# Patient Record
Sex: MALE | Race: WHITE | Employment: UNEMPLOYED | URBAN - METROPOLITAN AREA
[De-identification: names, ages, dates, MRNs, and addresses within clinical notes are randomized per-mention and may not be internally consistent; named-entity substitution may affect disease eponyms.]

---

## 2019-07-15 ENCOUNTER — IP HISTORICAL/CONVERTED ENCOUNTER (OUTPATIENT)
Dept: OTHER | Age: 39
End: 2019-07-15

## 2019-12-13 ENCOUNTER — HOSPITAL ENCOUNTER (INPATIENT)
Age: 39
LOS: 2 days | Discharge: HOME | DRG: 861 | End: 2019-12-16
Attending: EMERGENCY MEDICINE | Admitting: PSYCHIATRY & NEUROLOGY
Payer: MEDICAID

## 2019-12-13 DIAGNOSIS — F33.9 RECURRENT MAJOR DEPRESSIVE DISORDER, REMISSION STATUS UNSPECIFIED (HCC): Primary | ICD-10-CM

## 2019-12-13 LAB
ALBUMIN SERPL-MCNC: 3.9 G/DL (ref 3.5–5)
ALBUMIN/GLOB SERPL: 1.1 {RATIO} (ref 1.1–2.2)
ALP SERPL-CCNC: 86 U/L (ref 45–117)
ALT SERPL-CCNC: 21 U/L (ref 12–78)
AMPHET UR QL SCN: POSITIVE
ANION GAP SERPL CALC-SCNC: 9 MMOL/L (ref 5–15)
AST SERPL-CCNC: 14 U/L (ref 15–37)
BARBITURATES UR QL SCN: NEGATIVE
BASOPHILS # BLD: 0 K/UL (ref 0–0.1)
BASOPHILS NFR BLD: 1 % (ref 0–1)
BENZODIAZ UR QL: NEGATIVE
BILIRUB SERPL-MCNC: 0.3 MG/DL (ref 0.2–1)
BUN SERPL-MCNC: 11 MG/DL (ref 6–20)
BUN/CREAT SERPL: 12 (ref 12–20)
CALCIUM SERPL-MCNC: 9.4 MG/DL (ref 8.5–10.1)
CANNABINOIDS UR QL SCN: POSITIVE
CHLORIDE SERPL-SCNC: 102 MMOL/L (ref 97–108)
CO2 SERPL-SCNC: 28 MMOL/L (ref 21–32)
COCAINE UR QL SCN: NEGATIVE
CREAT SERPL-MCNC: 0.89 MG/DL (ref 0.7–1.3)
DIFFERENTIAL METHOD BLD: NORMAL
DRUG SCRN COMMENT,DRGCM: ABNORMAL
EOSINOPHIL # BLD: 0.1 K/UL (ref 0–0.4)
EOSINOPHIL NFR BLD: 2 % (ref 0–7)
ERYTHROCYTE [DISTWIDTH] IN BLOOD BY AUTOMATED COUNT: 13.4 % (ref 11.5–14.5)
ETHANOL SERPL-MCNC: <10 MG/DL
GLOBULIN SER CALC-MCNC: 3.4 G/DL (ref 2–4)
GLUCOSE SERPL-MCNC: 127 MG/DL (ref 65–100)
HCT VFR BLD AUTO: 44.5 % (ref 36.6–50.3)
HGB BLD-MCNC: 15.7 G/DL (ref 12.1–17)
IMM GRANULOCYTES # BLD AUTO: 0 K/UL (ref 0–0.04)
IMM GRANULOCYTES NFR BLD AUTO: 0 % (ref 0–0.5)
LYMPHOCYTES # BLD: 2.2 K/UL (ref 0.8–3.5)
LYMPHOCYTES NFR BLD: 28 % (ref 12–49)
MCH RBC QN AUTO: 32.7 PG (ref 26–34)
MCHC RBC AUTO-ENTMCNC: 35.3 G/DL (ref 30–36.5)
MCV RBC AUTO: 92.7 FL (ref 80–99)
METHADONE UR QL: NEGATIVE
MONOCYTES # BLD: 0.8 K/UL (ref 0–1)
MONOCYTES NFR BLD: 10 % (ref 5–13)
NEUTS SEG # BLD: 4.7 K/UL (ref 1.8–8)
NEUTS SEG NFR BLD: 60 % (ref 32–75)
NRBC # BLD: 0 K/UL (ref 0–0.01)
NRBC BLD-RTO: 0 PER 100 WBC
OPIATES UR QL: NEGATIVE
PCP UR QL: NEGATIVE
PLATELET # BLD AUTO: 181 K/UL (ref 150–400)
PMV BLD AUTO: 11.5 FL (ref 8.9–12.9)
POTASSIUM SERPL-SCNC: 3.9 MMOL/L (ref 3.5–5.1)
PROT SERPL-MCNC: 7.3 G/DL (ref 6.4–8.2)
RBC # BLD AUTO: 4.8 M/UL (ref 4.1–5.7)
SODIUM SERPL-SCNC: 139 MMOL/L (ref 136–145)
WBC # BLD AUTO: 7.8 K/UL (ref 4.1–11.1)

## 2019-12-13 PROCEDURE — 85025 COMPLETE CBC W/AUTO DIFF WBC: CPT

## 2019-12-13 PROCEDURE — 80307 DRUG TEST PRSMV CHEM ANLYZR: CPT

## 2019-12-13 PROCEDURE — 36415 COLL VENOUS BLD VENIPUNCTURE: CPT

## 2019-12-13 PROCEDURE — 99285 EMERGENCY DEPT VISIT HI MDM: CPT

## 2019-12-13 PROCEDURE — 80053 COMPREHEN METABOLIC PANEL: CPT

## 2019-12-14 PROBLEM — F32.9 MAJOR DEPRESSIVE DISORDER: Status: ACTIVE | Noted: 2019-12-14

## 2019-12-14 PROCEDURE — 74011250637 HC RX REV CODE- 250/637: Performed by: PSYCHIATRY & NEUROLOGY

## 2019-12-14 PROCEDURE — 65220000003 HC RM SEMIPRIVATE PSYCH

## 2019-12-14 PROCEDURE — 74011250637 HC RX REV CODE- 250/637: Performed by: FAMILY MEDICINE

## 2019-12-14 PROCEDURE — 74011250637 HC RX REV CODE- 250/637: Performed by: NURSE PRACTITIONER

## 2019-12-14 RX ORDER — IBUPROFEN 200 MG
1 TABLET ORAL DAILY
Status: DISCONTINUED | OUTPATIENT
Start: 2019-12-14 | End: 2019-12-16 | Stop reason: HOSPADM

## 2019-12-14 RX ORDER — TRAZODONE HYDROCHLORIDE 50 MG/1
50 TABLET ORAL
Status: DISCONTINUED | OUTPATIENT
Start: 2019-12-14 | End: 2019-12-16 | Stop reason: HOSPADM

## 2019-12-14 RX ORDER — BENZTROPINE MESYLATE 1 MG/1
1 TABLET ORAL
Status: DISCONTINUED | OUTPATIENT
Start: 2019-12-14 | End: 2019-12-16 | Stop reason: HOSPADM

## 2019-12-14 RX ORDER — ADHESIVE BANDAGE
30 BANDAGE TOPICAL DAILY PRN
Status: DISCONTINUED | OUTPATIENT
Start: 2019-12-14 | End: 2019-12-16 | Stop reason: HOSPADM

## 2019-12-14 RX ORDER — DOXYCYCLINE HYCLATE 100 MG
100 TABLET ORAL EVERY 12 HOURS
Status: DISCONTINUED | OUTPATIENT
Start: 2019-12-14 | End: 2019-12-16 | Stop reason: HOSPADM

## 2019-12-14 RX ORDER — HALOPERIDOL 5 MG/ML
5 INJECTION INTRAMUSCULAR
Status: DISCONTINUED | OUTPATIENT
Start: 2019-12-14 | End: 2019-12-16 | Stop reason: HOSPADM

## 2019-12-14 RX ORDER — ACETAMINOPHEN 325 MG/1
650 TABLET ORAL
Status: DISCONTINUED | OUTPATIENT
Start: 2019-12-14 | End: 2019-12-15

## 2019-12-14 RX ORDER — NAPROXEN 250 MG/1
500 TABLET ORAL
Status: DISCONTINUED | OUTPATIENT
Start: 2019-12-14 | End: 2019-12-16 | Stop reason: HOSPADM

## 2019-12-14 RX ORDER — HYDROXYZINE 25 MG/1
50 TABLET, FILM COATED ORAL
Status: DISCONTINUED | OUTPATIENT
Start: 2019-12-14 | End: 2019-12-16 | Stop reason: HOSPADM

## 2019-12-14 RX ORDER — LORAZEPAM 2 MG/ML
1 INJECTION INTRAMUSCULAR
Status: DISCONTINUED | OUTPATIENT
Start: 2019-12-14 | End: 2019-12-16 | Stop reason: HOSPADM

## 2019-12-14 RX ORDER — OLANZAPINE 5 MG/1
5 TABLET ORAL
Status: DISCONTINUED | OUTPATIENT
Start: 2019-12-14 | End: 2019-12-16 | Stop reason: HOSPADM

## 2019-12-14 RX ORDER — DIPHENHYDRAMINE HYDROCHLORIDE 50 MG/ML
50 INJECTION, SOLUTION INTRAMUSCULAR; INTRAVENOUS
Status: DISCONTINUED | OUTPATIENT
Start: 2019-12-14 | End: 2019-12-16 | Stop reason: HOSPADM

## 2019-12-14 RX ADMIN — Medication: at 12:29

## 2019-12-14 RX ADMIN — DOXYCYCLINE HYCLATE 100 MG: 100 TABLET, COATED ORAL at 12:29

## 2019-12-14 RX ADMIN — ACETAMINOPHEN 650 MG: 325 TABLET ORAL at 09:58

## 2019-12-14 RX ADMIN — DOXYCYCLINE HYCLATE 100 MG: 100 TABLET, COATED ORAL at 21:17

## 2019-12-14 RX ADMIN — HYDROXYZINE HYDROCHLORIDE 50 MG: 25 TABLET, FILM COATED ORAL at 10:10

## 2019-12-14 NOTE — ED NOTES
Patient presents to ED under an ECO with [place] police. Police were called out to patient home for a domestic. Patient states that he has been  for 15 years and caught wife cheating. Patient also states that younger daughter committed suicide around this time of year and this can be a trigger. States that he currently takes Wellbutrin daily. Denies alcohol and drug use. Denies SI/HI. Patient is alert and oriented x 4 and in no acute distress at this time. Respirations are at a regular rate, depth, and pattern. Patient updated on plan of care and has no questions or concerns at this time. Call bell within reach. Will continue to monitor. Please reference nursing assessment. Emergency Department Nursing Plan of Care       The Nursing Plan of Care is developed from the Nursing assessment and Emergency Department Attending provider initial evaluation. The plan of care may be reviewed in the ED Provider note.     The Plan of Care was developed with the following considerations:   Patient / Family readiness to learn indicated by:verbalized understanding and successful return demonstration  Persons(s) to be included in education: patient  Barriers to Learning/Limitations:No    Signed     Flo Nino RN    12/13/2019   9:34 PM

## 2019-12-14 NOTE — ED PROVIDER NOTES
EMERGENCY DEPARTMENT HISTORY AND PHYSICAL EXAM      Date: 12/13/2019  Patient Name: Stanislaw Lang    History of Presenting Illness     Chief Complaint   Patient presents with    Mental Health Problem       History Provided By: Patient, Crisis    HPI: Stanislaw Lang, 46 y.o. male with PMHx significant for depression who presents in police custody for mental health problem. To me, patient reports that this evening he found out his wife was cheating on him and became upset. He states that his wife then called and got a EC O filed on him to be brought to the emergency department. Patient denies any suicidal or homicidal thoughts at this time. Does admit that back in July he attempted to overdose. Reports that this was in the setting of his daughter committing suicide. He tells me \"I made a stupid mistake. \"  He tells me he is compliant with his home Wellbutrin. PCP: None    There are no other complaints, changes, or physical findings at this time. Current Outpatient Medications   Medication Sig Dispense Refill    bupropion HCl (WELLBUTRIN XL PO) Take 50 mg by mouth. Past History     Past Medical History:  History reviewed. No pertinent past medical history. Past Surgical History:  History reviewed. No pertinent surgical history. Family History:  History reviewed. No pertinent family history. Social History:  Social History     Tobacco Use    Smoking status: Not on file   Substance Use Topics    Alcohol use: Not on file    Drug use: Not on file     Allergies: Allergies   Allergen Reactions    Pcn [Penicillins] Anaphylaxis     Review of Systems   Review of Systems   Constitutional: Negative for chills and fever. HENT: Negative for congestion, rhinorrhea and sore throat. Respiratory: Negative for cough and shortness of breath. Cardiovascular: Negative for chest pain. Gastrointestinal: Negative for abdominal pain, nausea and vomiting.    Genitourinary: Negative for dysuria and urgency. Skin: Negative for rash. Neurological: Negative for dizziness, light-headedness and headaches. All other systems reviewed and are negative. Physical Exam   Physical Exam  Vitals signs and nursing note reviewed. Constitutional:       General: He is not in acute distress. Appearance: He is well-developed. HENT:      Head: Normocephalic and atraumatic. Eyes:      Conjunctiva/sclera: Conjunctivae normal.      Pupils: Pupils are equal, round, and reactive to light. Neck:      Musculoskeletal: Normal range of motion. Cardiovascular:      Rate and Rhythm: Normal rate and regular rhythm. Pulmonary:      Effort: Pulmonary effort is normal. No respiratory distress. Breath sounds: Normal breath sounds. No stridor. Abdominal:      General: There is no distension. Palpations: Abdomen is soft. Tenderness: There is no tenderness. Musculoskeletal: Normal range of motion. Skin:     General: Skin is warm and dry. Neurological:      Mental Status: He is alert and oriented to person, place, and time. Psychiatric:         Attention and Perception: Attention normal.         Mood and Affect: Mood normal.         Speech: Speech normal.         Behavior: Behavior is cooperative. Thought Content: Thought content does not include homicidal or suicidal ideation. Diagnostic Study Results   Labs -     Recent Results (from the past 12 hour(s))   CBC WITH AUTOMATED DIFF    Collection Time: 12/13/19 10:22 PM   Result Value Ref Range    WBC 7.8 4.1 - 11.1 K/uL    RBC 4.80 4. 10 - 5.70 M/uL    HGB 15.7 12.1 - 17.0 g/dL    HCT 44.5 36.6 - 50.3 %    MCV 92.7 80.0 - 99.0 FL    MCH 32.7 26.0 - 34.0 PG    MCHC 35.3 30.0 - 36.5 g/dL    RDW 13.4 11.5 - 14.5 %    PLATELET 676 476 - 288 K/uL    MPV 11.5 8.9 - 12.9 FL    NRBC 0.0 0  WBC    ABSOLUTE NRBC 0.00 0.00 - 0.01 K/uL    NEUTROPHILS 60 32 - 75 %    LYMPHOCYTES 28 12 - 49 %    MONOCYTES 10 5 - 13 %    EOSINOPHILS 2 0 - 7 % BASOPHILS 1 0 - 1 %    IMMATURE GRANULOCYTES 0 0.0 - 0.5 %    ABS. NEUTROPHILS 4.7 1.8 - 8.0 K/UL    ABS. LYMPHOCYTES 2.2 0.8 - 3.5 K/UL    ABS. MONOCYTES 0.8 0.0 - 1.0 K/UL    ABS. EOSINOPHILS 0.1 0.0 - 0.4 K/UL    ABS. BASOPHILS 0.0 0.0 - 0.1 K/UL    ABS. IMM. GRANS. 0.0 0.00 - 0.04 K/UL    DF AUTOMATED         Radiologic Studies -   No orders to display     No results found. Medical Decision Making   I am the first provider for this patient. I reviewed the vital signs, available nursing notes, past medical history, past surgical history, family history and social history. Vital Signs-Reviewed the patient's vital signs. Patient Vitals for the past 12 hrs:   Temp Pulse Resp BP SpO2   12/13/19 2125 98.1 °F (36.7 °C) 63 18 146/86 99 %       Pulse Oximetry Analysis - 99% on RA      Records Reviewed: Nursing Notes and Old Medical Records    Provider Notes (Medical Decision Making):   Patient presents in police custody for mental health problem. Patient currently denies any suicidal homicidal thoughts. I discussed with crisis would be gathering collateral information. Reportedly, patient had been acting more erratic for last several days. Wife told crisis that the patient was refusing to let her leave the home and had been verbally abusive. Wife also reported to crisis that the patient was not taking his medications as prescribed. Will proceed with basic lab work, alcohol level, urine drug screen. Crisis currently gathering additional collateral information to determine disposition    ED Course:   Initial assessment performed. The patients presenting problems have been discussed, and they are in agreement with the care plan formulated and outlined with them. I have encouraged them to ask questions as they arise throughout their visit.     ED Course as of Dec 14 0148   Fri Dec 13, 2019   2252 Patient is medically cleared    [ALEJANDRA]   Sat Dec 14, 2019   0031 Per crisis, plan for tdo     [ALEJANDRA]      ED Course User Index  Danniel Dancer, MD     Admitted by Dr. Suly Khan at [address]:  none    Disposition:  Admit to psych      Diagnosis     Clinical Impression:   1. Recurrent major depressive disorder, remission status unspecified ([facility]        This note will not be viewable in [employer]. Please note that this dictation was completed with Language Learning [employer], the computer voice recognition software. Quite often unanticipated grammatical, syntax, homophones, and other interpretive errors are inadvertently transcribed by the computer software. Please disregard these errors.   Please excuse any errors that have escaped final proofreading

## 2019-12-14 NOTE — ED NOTES
TRANSFER - OUT REPORT:    Verbal report given to JEFFERSON (name) on Yaneth Sellers  being transferred to [place] [id number] (unit) for routine progression of care       Report consisted of patients Situation, Background, Assessment and   Recommendations(SBAR). Information from the following report(s) SBAR, Kardex, ED Summary, Intake/Output, MAR and Recent Results was reviewed with the receiving nurse. Lines:       Opportunity for questions and clarification was provided.       Patient transported with:   Officer and [employer]

## 2019-12-14 NOTE — H&P
Hospitalist Admission Note    NAME: Mahamed Phillips   :  1980   MRN:  [id number]     Date/Time:  2019 12:25 PM    Patient PCP: None  ______________________________________________________________________  Given the patient's current clinical presentation, I have a high level of concern for decompensation if discharged from the emergency department. Complex decision making was performed, which includes reviewing the patient's available past medical records, laboratory results, and x-ray films. My assessment of this patient's clinical condition and my plan of care is as follows. Assessment / Plan:  Patient 59-year-old male past medical history for depression, brought in police custody for mental health problem. Hospitalist consulted for medical clearance. No significant past medical history reported. few weeks of upper jaw, (right side) pain and discomfort. Has not seen a dentist.  1.  Periodontal infection; start on p.o. antibiotic,doxycycline for 7 days, pain management with NSAID, Tylenol. Code Status, DVT prophylaxis, disposition by primary team.        Subjective:   CHIEF COMPLAINT: Consult from behavioral health unit for medical clearance, dental pain. HISTORY OF PRESENT ILLNESS:     Juliette Velasco is a 46 y.o.  male who presents with depression, behavioral health problem. States his wife was cheating on him he was upset. Patient brought in by by police for further evaluation. Reports no significant past medical history has been suffering from upper jaw right side toothache for few weeks. He attempted to make appointment with dentis . Pain being reported as severe constant. No fever or chills no breathing problem. States that only taking Wellbutrin. Denies history of diabetes hypertension hyperlipidemia coronary artery disease. We were asked to admit for work up and evaluation of the above problems. History reviewed. No pertinent past medical history. History reviewed. No pertinent surgical history. Social History     Tobacco Use    Smoking status: Not on file   Substance Use Topics    Alcohol use: Not on file        History reviewed. No pertinent family history. Allergies   Allergen Reactions    Pcn [Penicillins] Anaphylaxis        Prior to Admission medications    Medication Sig Start Date End Date Taking? Authorizing Provider   bupropion HCl (WELLBUTRIN XL PO) Take 50 mg by mouth. Yes Other, MD Av       REVIEW OF SYSTEMS:     I am not able to complete the review of systems because: The patient is intubated and sedated    The patient has altered mental status due to his acute medical problems    The patient has baseline aphasia from prior stroke(s)    The patient has baseline dementia and is not reliable historian    The patient is in acute medical distress and unable to provide information           Total of 12 systems reviewed as follows:       POSITIVE= underlined text  Negative = text not underlined  General:  fever, chills, sweats, generalized weakness, weight loss/gain,      loss of appetite   Eyes:    blurred vision, eye pain, loss of vision, double vision  ENT:    rhinorrhea, pharyngitis. Positive for toothache.   Respiratory:   cough, sputum production, SOB, ARMENTA, wheezing, pleuritic pain   Cardiology:   chest pain, palpitations, orthopnea, PND, edema, syncope   Gastrointestinal:  abdominal pain , N/V, diarrhea, dysphagia, constipation, bleeding   Genitourinary:  frequency, urgency, dysuria, hematuria, incontinence   Muskuloskeletal :  arthralgia, myalgia, back pain  Hematology:  easy bruising, nose or gum bleeding, lymphadenopathy   Dermatological: rash, ulceration, pruritis, color change / jaundice  Endocrine:   hot flashes or polydipsia   Neurological:  headache, dizziness, confusion, focal weakness, paresthesia,     Speech difficulties, memory loss, gait difficulty       Objective:   VITALS:    Visit Vitals  /74 (BP 1 Location: Left arm, BP Patient Position: Sitting)   Pulse 89   Temp 97.9 °F (36.6 °C)   Resp 16   Ht 6' 1\" (1.854 m)   Wt 90.7 kg (200 lb)   SpO2 100%   BMI 26.39 kg/m²       PHYSICAL EXAM:    General:    Alert, cooperative, no distress, appears stated age. HEENT: Atraumatic, anicteric sclerae, pink conjunctivae     No oral ulcers, mucosa moist, throat clear, dental caries multiple, no abscess found  Neck:  Supple, symmetrical,  thyroid: non tender  Lungs:   Clear to auscultation bilaterally. No Wheezing or Rhonchi. No rales. Chest wall:  No tenderness  No Accessory muscle use. Heart:   Regular  rhythm,  No  murmur   No edema  Abdomen:   Soft, non-tender. Not distended. Bowel sounds normal  Extremities: No cyanosis. No clubbing,      Skin turgor normal, Capillary refill normal, Radial dial pulse 2+  Skin:     Not pale. Not Jaundiced  No rashes   Psych:  Good insight. Not depressed. Not anxious or agitated. Neurologic: EOMs intact. No facial asymmetry. No aphasia or slurred speech. Symmetrical strength, Sensation grossly intact.  Alert and oriented X 4.     _______________________________________________________________________  Care Plan discussed with:    Comments   Patient     Family      RN     Care Manager                    Consultant:      _______________________________________________________________________  Expected  Disposition:   Home with Family    HH/PT/OT/RN    SNF/LTC    YOBANI    ________________________________________________________________________  TOTAL TIME: 30 minutes    Critical Care Provided     Minutes non procedure based      Comments     Reviewed previous records   >50% of visit spent in counseling and coordination of care  Discussion with patient and/or family and questions answered       ________________________________________________________________________  Signed: Jas Serrano MD    Procedures: see electronic medical records for all procedures/Xrays and details which were not copied into this note but were reviewed prior to creation of Plan. LAB DATA REVIEWED:    Recent Results (from the past 24 hour(s))   ETHYL ALCOHOL    Collection Time: 12/13/19 10:22 PM   Result Value Ref Range    ALCOHOL(ETHYL),SERUM <10 <10 MG/DL   CBC WITH AUTOMATED DIFF    Collection Time: 12/13/19 10:22 PM   Result Value Ref Range    WBC 7.8 4.1 - 11.1 K/uL    RBC 4.80 4. 10 - 5.70 M/uL    HGB 15.7 12.1 - 17.0 g/dL    HCT 44.5 36.6 - 50.3 %    MCV 92.7 80.0 - 99.0 FL    MCH 32.7 26.0 - 34.0 PG    MCHC 35.3 30.0 - 36.5 g/dL    RDW 13.4 11.5 - 14.5 %    PLATELET 390 662 - 789 K/uL    MPV 11.5 8.9 - 12.9 FL    NRBC 0.0 0  WBC    ABSOLUTE NRBC 0.00 0.00 - 0.01 K/uL    NEUTROPHILS 60 32 - 75 %    LYMPHOCYTES 28 12 - 49 %    MONOCYTES 10 5 - 13 %    EOSINOPHILS 2 0 - 7 %    BASOPHILS 1 0 - 1 %    IMMATURE GRANULOCYTES 0 0.0 - 0.5 %    ABS. NEUTROPHILS 4.7 1.8 - 8.0 K/UL    ABS. LYMPHOCYTES 2.2 0.8 - 3.5 K/UL    ABS. MONOCYTES 0.8 0.0 - 1.0 K/UL    ABS. EOSINOPHILS 0.1 0.0 - 0.4 K/UL    ABS. BASOPHILS 0.0 0.0 - 0.1 K/UL    ABS. IMM. GRANS. 0.0 0.00 - 0.04 K/UL    DF AUTOMATED     METABOLIC PANEL, COMPREHENSIVE    Collection Time: 12/13/19 10:22 PM   Result Value Ref Range    Sodium 139 136 - 145 mmol/L    Potassium 3.9 3.5 - 5.1 mmol/L    Chloride 102 97 - 108 mmol/L    CO2 28 21 - 32 mmol/L    Anion gap 9 5 - 15 mmol/L    Glucose 127 (H) 65 - 100 mg/dL    BUN 11 6 - 20 MG/DL    Creatinine 0.89 0.70 - 1.30 MG/DL    BUN/Creatinine ratio 12 12 - 20      GFR est AA >60 >60 ml/min/1.73m2    GFR est non-AA >60 >60 ml/min/1.73m2    Calcium 9.4 8.5 - 10.1 MG/DL    Bilirubin, total 0.3 0.2 - 1.0 MG/DL    ALT (SGPT) 21 12 - 78 U/L    AST (SGOT) 14 (L) 15 - 37 U/L    Alk.  phosphatase 86 45 - 117 U/L    Protein, total 7.3 6.4 - 8.2 g/dL    Albumin 3.9 3.5 - 5.0 g/dL    Globulin 3.4 2.0 - 4.0 g/dL    A-G Ratio 1.1 1.1 - 2.2     DRUG SCREEN, URINE    Collection Time: 12/13/19 10:23 PM   Result Value Ref Range AMPHETAMINES POSITIVE (A) NEG      BARBITURATES NEGATIVE  NEG      BENZODIAZEPINES NEGATIVE  NEG      COCAINE NEGATIVE  NEG      METHADONE NEGATIVE  NEG      OPIATES NEGATIVE  NEG      PCP(PHENCYCLIDINE) NEGATIVE  NEG      THC (TH-CANNABINOL) POSITIVE (A) NEG      Drug screen comment (NOTE)

## 2019-12-14 NOTE — BH NOTES
6841  Patient arrived on the unit for admission. Patient cooperative with intake. No issues or behaviors noted at this time. 0300  Patient resting in bed at this time. Respirations are easy and non labored    0436  Patient resting quietly    9994  Patient slept a total of 3.5 hours this evening.   Has been sleeping well since arrival

## 2019-12-14 NOTE — H&P
[facility] HISTORY AND PHYSICAL    Name:  Flynn Linda  MR#:  [id number]  :  1980  ACCOUNT #:  [de-identified]  ADMIT DATE:  2019    PSYCHIATRIC INTAKE NOTE    CHIEF COMPLAINT:  \"I found out my wife was cheating on me and she green warrented me. \"    HISTORY OF PRESENT ILLNESS:  This is a 44-year-old  male with a history of depression, brought in under a TDO, one prior hospitalization after his daughter  a year ago, where he and his wife fell out of emotional contact with each other. The patient is not suicidal or homicidal.  He was angry. He made a stupid mistake and was compliant with his medications before. He made a stupid mistake when he tried to commit suicide before because of his daughter's death, but he denies any of that now. He has been compliant with his Wellbutrin and has no other concerns, but because of his TDO, he is here and he is going to have his time in court, he says. PAST PSYCHIATRIC HISTORY:  As stated above. PAST MEDICAL HISTORY:  Denies. ALLERGIES:  TO PENICILLIN, CAUSES HIS THROAT TO CLOSE. SOCIAL HISTORY:  , four children, one passed away recently. He is on Disability. Smokes about 2 packs of cigarettes a day, but does not want a patch. Denies any alcohol or drugs of abuse. MENTAL STATUS EXAMINATION:  Adult male, calm, cooperative. Clear, coherent speech of average rate, volume, and tone. Mood is fine. Affect full range. Thoughts are linear, goal-directed. Denied suicidal or homicidal ideations and no auditory or visual hallucinations, but feels that his marriage is over. Aware of his surroundings, location, situations. Here for management under a TDO, but likely to be released once the court hears the story. DIAGNOSIS:  Adjustment disorder with mixed features. PLAN:  Admit for safety, stabilization, medication modification as needed, group therapy, individual therapy.     ESTIMATED LENGTH OF STAY: 1-3 days. DISPOSITION:  Planning with Social Work. STRENGTHS:  Willingness for treatment, calm and appropriate demeanor. DISABILITY:  Prior suicide attempt and loss of a child and infidelity of his wife. PHILLIP B. Salome Seip, MD      [id number]  D:  12/14/2019 11:22  T:  12/14/2019 18:31  JOB #:  [id number]

## 2019-12-14 NOTE — PROGRESS NOTES
Problem: Falls - Risk of  Goal: *Absence of Falls  Description  Document Eddiejesus Hernandez Fall Risk and appropriate interventions in the flowsheet.   Outcome: Progressing Towards Goal  Note: Fall Risk Interventions:            Medication Interventions: Teach patient to arise slowly                   Problem: Depressed Mood (Adult/Pediatric)  Goal: *STG: Participates in treatment plan  Outcome: Progressing Towards Goal

## 2019-12-14 NOTE — PROGRESS NOTES
Problem: Falls - Risk of  Goal: *Absence of Falls  Description  Document Yadira Dear Fall Risk and appropriate interventions in the flowsheet.   Outcome: Progressing Towards Goal  Note: Fall Risk Interventions:            Medication Interventions: Teach patient to arise slowly                   Problem: Depressed Mood (Adult/Pediatric)  Goal: *STG: Participates in treatment plan  Outcome: Progressing Towards Goal  Goal: *STG: Participates in 1:1 therapy sessions  Outcome: Progressing Towards Goal  Goal: *STG: Verbalizes anger, guilt, and other feelings in a constructive manor  Outcome: Progressing Towards Goal  Goal: *STG: Attends activities and groups  Outcome: Progressing Towards Goal  Goal: *STG: Demonstrates reduction in symptoms and increase in insight into coping skills/future focused  Outcome: Progressing Towards Goal  Goal: *STG: Remains safe in hospital  Outcome: Progressing Towards Goal  Goal: *STG: Complies with medication therapy  Outcome: Progressing Towards Goal  Goal: *LTG: Returns to previous level of functioning and participates with after care plan  Outcome: Progressing Towards Goal  Goal: *LTG: Understands illness and can identify signs of relapse  Outcome: Progressing Towards Goal  Goal: Interventions  Outcome: Progressing Towards Goal

## 2019-12-15 PROCEDURE — 65220000003 HC RM SEMIPRIVATE PSYCH

## 2019-12-15 PROCEDURE — 74011250637 HC RX REV CODE- 250/637: Performed by: NURSE PRACTITIONER

## 2019-12-15 PROCEDURE — 74011250637 HC RX REV CODE- 250/637: Performed by: FAMILY MEDICINE

## 2019-12-15 RX ORDER — ACETAMINOPHEN 325 MG/1
650 TABLET ORAL
Status: DISCONTINUED | OUTPATIENT
Start: 2019-12-15 | End: 2019-12-16 | Stop reason: HOSPADM

## 2019-12-15 RX ADMIN — TRAZODONE HYDROCHLORIDE 50 MG: 50 TABLET ORAL at 21:13

## 2019-12-15 RX ADMIN — DOXYCYCLINE HYCLATE 100 MG: 100 TABLET, COATED ORAL at 21:13

## 2019-12-15 RX ADMIN — DOXYCYCLINE HYCLATE 100 MG: 100 TABLET, COATED ORAL at 09:03

## 2019-12-15 NOTE — BH NOTES
Pt denies all psych symptoms; does not attend groups but attends meals; flat affect;vitals are normal;isolative to room,no PRN's given ;no acute distress ;continue to monitor and provide support when needed.

## 2019-12-15 NOTE — BH NOTES
Patient resting in his bed with no complaints denies SI/HI/AV  And is medication compliant  Hourly rounding done   Slept 8 hours

## 2019-12-15 NOTE — PROGRESS NOTES
Problem: Falls - Risk of  Goal: *Absence of Falls  Description  Document Malena Granger Fall Risk and appropriate interventions in the flowsheet.   Outcome: Progressing Towards Goal  Note: Fall Risk Interventions:            Medication Interventions: Teach patient to arise slowly                   Problem: Depressed Mood (Adult/Pediatric)  Goal: *STG: Participates in treatment plan  Outcome: Progressing Towards Goal  Goal: *STG: Verbalizes anger, guilt, and other feelings in a constructive manor  Outcome: Progressing Towards Goal  Goal: *LTG: Returns to previous level of functioning and participates with after care plan  Outcome: Progressing Towards Goal     Problem: Depressed Mood (Adult/Pediatric)  Goal: *STG: Participates in treatment plan  Outcome: Progressing Towards Goal

## 2019-12-15 NOTE — BH NOTES
Psychiatric Progress Note    Patient: Frandy Nix MRN: [id number]  SSN: [id number]    YOB: 1980  Age: 46 y.o. Sex: male      Admit Date: 12/13/2019    LOS: 1 day     Subjective:     Frandy Nix  reports feeling fine and moods are good. Denies SI/HI/AH/VH. No aggression or violence. Appropriately interactive and aware. Tolerating medications well. Eating well and sleeping well.     Objective:     Vitals:    12/14/19 0212 12/14/19 0803 12/14/19 2033 12/15/19 0735   BP: 131/84 141/74 143/76 (!) 127/94   Pulse: 65 89 75 94   Resp: 16 16 16 16   Temp: 97.3 °F (36.3 °C) 97.9 °F (36.6 °C) 98.7 °F (37.1 °C) 98.1 °F (36.7 °C)   SpO2: 97% 100% 98% 98%   Weight:       Height: 6' 1\" (1.854 m)           Mental Status Exam:   Sensorium  oriented to time, place and person   Orientation situation   Relations cooperative   Eye Contact appropriate   Appearance:  age appropriate   Motor Behavior:  within normal limits   Speech:  normal volume and non-pressured   Vocabulary average   Thought Process: goal directed   Thought Content free of delusions and free of hallucinations   Suicidal ideations none   Homicidal ideations none   Mood:  euthymic   Affect:  full range   Memory recent  adequate   Memory remote:  adequate   Concentration:  adequate   Abstraction:  abstract   Insight:  good   Reliability fair   Judgment:  good       MEDICATIONS:  Current Facility-Administered Medications   Medication Dose Route Frequency    acetaminophen (TYLENOL) tablet 650 mg  650 mg Oral Q4H PRN    OLANZapine (ZyPREXA) tablet 5 mg  5 mg Oral Q6H PRN    haloperidol lactate (HALDOL) injection 5 mg  5 mg IntraMUSCular Q6H PRN    benztropine (COGENTIN) tablet 1 mg  1 mg Oral BID PRN    hydrOXYzine HCl (ATARAX) tablet 50 mg  50 mg Oral TID PRN    LORazepam (ATIVAN) injection 1 mg  1 mg IntraMUSCular Q4H PRN    traZODone (DESYREL) tablet 50 mg  50 mg Oral QHS PRN    magnesium hydroxide (MILK OF MAGNESIA) 400 mg/5 mL oral suspension 30 mL  30 mL Oral DAILY PRN    diphenhydrAMINE (BENADRYL) injection 50 mg  50 mg IntraMUSCular Q6H PRN    nicotine (NICODERM CQ) 21 mg/24 hr patch 1 Patch  1 Patch TransDERmal DAILY    benzocaine-zinc cl-benzalkonium cl (ORAJEL) 20-0.1-0.02 % mucosal gel   Oral PRN    doxycycline (VIBRA-TABS) tablet 100 mg  100 mg Oral Q12H    naproxen (NAPROSYN) tablet 500 mg  500 mg Oral BID PRN      DISCUSSION:   the risks and benefits of the proposed medication  patient given opportunity to ask questions    Lab/Data Review: All lab results for the last 24 hours reviewed.      None    Assessment:     Principal Problem:    Major depressive disorder (12/14/2019)        Plan:     Continue current care  Collateral information  TDO hearing tomorrow  Disposition planning with social work    Signed By: Claus Franz MD     December 15, 2019

## 2019-12-16 VITALS
SYSTOLIC BLOOD PRESSURE: 124 MMHG | OXYGEN SATURATION: 100 % | HEART RATE: 96 BPM | HEIGHT: 73 IN | RESPIRATION RATE: 16 BRPM | DIASTOLIC BLOOD PRESSURE: 67 MMHG | TEMPERATURE: 98.2 F | WEIGHT: 200 LBS | BODY MASS INDEX: 26.51 KG/M2

## 2019-12-16 PROCEDURE — 74011250637 HC RX REV CODE- 250/637: Performed by: NURSE PRACTITIONER

## 2019-12-16 PROCEDURE — 74011250637 HC RX REV CODE- 250/637: Performed by: FAMILY MEDICINE

## 2019-12-16 PROCEDURE — 74011250637 HC RX REV CODE- 250/637: Performed by: PSYCHIATRY & NEUROLOGY

## 2019-12-16 RX ORDER — BUPROPION HYDROCHLORIDE 150 MG/1
150 TABLET ORAL DAILY
Qty: 30 TAB | Refills: 0 | Status: SHIPPED | OUTPATIENT
Start: 2019-12-16

## 2019-12-16 RX ORDER — DOXYCYCLINE HYCLATE 100 MG
100 TABLET ORAL EVERY 12 HOURS
Qty: 10 TAB | Refills: 0 | Status: SHIPPED | OUTPATIENT
Start: 2019-12-16 | End: 2019-12-21

## 2019-12-16 RX ADMIN — ACETAMINOPHEN 650 MG: 325 TABLET ORAL at 13:19

## 2019-12-16 RX ADMIN — NAPROXEN 500 MG: 250 TABLET ORAL at 10:20

## 2019-12-16 RX ADMIN — DOXYCYCLINE HYCLATE 100 MG: 100 TABLET, COATED ORAL at 08:25

## 2019-12-16 RX ADMIN — Medication: at 13:19

## 2019-12-16 NOTE — DISCHARGE INSTRUCTIONS
DISCHARGE SUMMARY    Charlene Mcbride  : 1980  MRN: [id number]    The patient Dorys Leslie exhibits the ability to control behavior in a less restrictive environment. Patient's level of functioning is improving. No assaultive/destructive behavior has been observed for the past 24 hours. No suicidal/homicidal threat or behavior has been observed for the past 24 hours. There is no evidence of serious medication side effects. Patient has not been in physical or protective restraints for at least the past 24 hours. If weapons involved, how are they secured? None involved. Is patient aware of and in agreement with discharge plan? Arrangements for medication:  No medications      Copy of discharge instructions to provider?:  [address] for transportation home:  Wife to transport    Keep all follow up appointments as scheduled, continue to take prescribed medications per physician instructions. Mental health crisis number:  [phone] or your local mental health crisis line number at 2-763.670.2836. Patient Education        Depression and Chronic Disease: Care Instructions  Your Care Instructions    A chronic disease is one that you have for a long time. Some chronic diseases can be controlled, but they usually cannot be cured. Depression is common in people with chronic diseases, but it often goes unnoticed. Many people have concerns about seeking treatment for a mental health problem. You may think it's a sign of weakness, or you don't want people to know about it. It's important to overcome these reasons for not seeking treatment. Treating depression or anxiety is good for your health. Follow-up care is a key part of your treatment and safety. Be sure to make and go to all appointments, and call your doctor if you are having problems. It's also a good idea to know your test results and keep a list of the medicines you take. How can you care for yourself at home?   Watch for symptoms of depression  The symptoms of depression are often subtle at first. You may think they are caused by your disease rather than depression. Or you may think it is normal to be depressed when you have a chronic disease. If you are depressed you may:  · Feel sad or hopeless. · Feel guilty or worthless. · Not enjoy the things you used to enjoy. · Feel hopeless, as though life is not worth living. · Have trouble thinking or remembering. · Have low energy, and you may not eat or sleep well. · Pull away from others. · Think often about death or killing yourself. (Keep the numbers for these national suicide hotlines: 3-073-038-TALK [1-906.770.7997] and 3-144-MQYVFYN [1-585.300.5127]. )  Get treatment  By treating your depression, you can feel more hopeful and have more energy. If you feel better, you may take better care of yourself, so your health may improve. · Talk to your doctor if you have any changes in mood during treatment for your disease. · Ask your doctor for help. Counseling, antidepressant medicine, or a combination of the two can help most people with depression. Often a combination works best. Counseling can also help you cope with having a chronic disease. When should you call for help? Call 911 anytime you think you may need emergency care. For example, call if:    · You feel like hurting yourself or someone else.     · Someone you know has depression and is about to attempt or is attempting suicide.   [facility] your doctor now or seek immediate medical care if:    · You hear voices.     · Someone you know has depression and:  ? Starts to give away his or her possessions. ? Uses illegal drugs or drinks alcohol heavily. ? Talks or writes about death, including writing suicide notes or talking about guns, knives, or pills. ? Starts to spend a lot of time alone. ?  Acts very aggressively or suddenly appears calm.    Watch closely for changes in your health, and be sure to contact your doctor if:    · You do not get better as expected. Where can you learn more? Go to http://sherry-ramila.info/. Enter J653 in the search box to learn more about \"Depression and Chronic Disease: Care Instructions. \"  Current as of: May 28, 2019  Content Version: 12.2  © 9734-1717 [employer], [employer]. Care instructions adapted under license by [employer] (which disclaims liability or warranty for this information). If you have questions about a medical condition or this instruction, always ask your healthcare professional. [place] [address] any warranty or liability for your use of this information. .If I feel I am at risk of hurting myself or others, I will call the crisis office and speak with a crisis worker who will assist me during my crisis. Treyshikha Hinojosa [address]  [phone]  [address] [place] [address] [phone] [address] [facility]  [address] crisis- [phone]

## 2019-12-16 NOTE — PROGRESS NOTES
Problem: Depressed Mood (Adult/Pediatric)  Goal: *STG: Verbalizes anger, guilt, and other feelings in a constructive manor  Outcome: Progressing Towards Goal  Goal: *STG: Demonstrates reduction in symptoms and increase in insight into coping skills/future focused  Outcome: Progressing Towards Goal  Goal: *STG: Remains safe in hospital  Outcome: Progressing Towards Goal  Goal: *STG: Complies with medication therapy  Outcome: Progressing Towards Goal  Goal: *LTG: Returns to previous level of functioning and participates with after care plan  Outcome: Progressing Towards Goal     Progressing.

## 2019-12-16 NOTE — BH NOTES
Behavioral Health Transition Record to Provider    Patient Name: Frandy Nix  YOB: 1980  Medical Record Number: [id number]  Date of Admission: 12/13/2019  Date of Discharge: 12/16/2019    Attending Provider: Charles Marrufo, *  Discharging Provider: Oliva Klein MD    To contact this individual call [phone] and ask the  to page. If unavailable, ask to be transferred to [facility] Provider on call. [facility] Provider will be available on call 24/7 and during holidays. Primary Care Provider: None    Allergies   Allergen Reactions    Pcn [Penicillins] Anaphylaxis       Reason for Admission: Increased depression     Admission Diagnosis: Major depressive disorder [F32.9]    * No surgery found *    Results for orders placed or performed during the hospital encounter of 12/13/19   ETHYL ALCOHOL   Result Value Ref Range    ALCOHOL(ETHYL),SERUM <10 <10 MG/DL   DRUG SCREEN, URINE   Result Value Ref Range    AMPHETAMINES POSITIVE (A) NEG      BARBITURATES NEGATIVE  NEG      BENZODIAZEPINES NEGATIVE  NEG      COCAINE NEGATIVE  NEG      METHADONE NEGATIVE  NEG      OPIATES NEGATIVE  NEG      PCP(PHENCYCLIDINE) NEGATIVE  NEG      THC (TH-CANNABINOL) POSITIVE (A) NEG      Drug screen comment (NOTE)    CBC WITH AUTOMATED DIFF   Result Value Ref Range    WBC 7.8 4.1 - 11.1 K/uL    RBC 4.80 4. 10 - 5.70 M/uL    HGB 15.7 12.1 - 17.0 g/dL    HCT 44.5 36.6 - 50.3 %    MCV 92.7 80.0 - 99.0 FL    MCH 32.7 26.0 - 34.0 PG    MCHC 35.3 30.0 - 36.5 g/dL    RDW 13.4 11.5 - 14.5 %    PLATELET 845 713 - 457 K/uL    MPV 11.5 8.9 - 12.9 FL    NRBC 0.0 0  WBC    ABSOLUTE NRBC 0.00 0.00 - 0.01 K/uL    NEUTROPHILS 60 32 - 75 %    LYMPHOCYTES 28 12 - 49 %    MONOCYTES 10 5 - 13 %    EOSINOPHILS 2 0 - 7 %    BASOPHILS 1 0 - 1 %    IMMATURE GRANULOCYTES 0 0.0 - 0.5 %    ABS. NEUTROPHILS 4.7 1.8 - 8.0 K/UL    ABS. LYMPHOCYTES 2.2 0.8 - 3.5 K/UL    ABS.  MONOCYTES 0.8 0.0 - 1.0 K/UL    ABS. EOSINOPHILS 0.1 0.0 - 0.4 K/UL    ABS. BASOPHILS 0.0 0.0 - 0.1 K/UL    ABS. IMM. GRANS. 0.0 0.00 - 0.04 K/UL    DF AUTOMATED     METABOLIC PANEL, COMPREHENSIVE   Result Value Ref Range    Sodium 139 136 - 145 mmol/L    Potassium 3.9 3.5 - 5.1 mmol/L    Chloride 102 97 - 108 mmol/L    CO2 28 21 - 32 mmol/L    Anion gap 9 5 - 15 mmol/L    Glucose 127 (H) 65 - 100 mg/dL    BUN 11 6 - 20 MG/DL    Creatinine 0.89 0.70 - 1.30 MG/DL    BUN/Creatinine ratio 12 12 - 20      GFR est AA >60 >60 ml/min/1.73m2    GFR est non-AA >60 >60 ml/min/1.73m2    Calcium 9.4 8.5 - 10.1 MG/DL    Bilirubin, total 0.3 0.2 - 1.0 MG/DL    ALT (SGPT) 21 12 - 78 U/L    AST (SGOT) 14 (L) 15 - 37 U/L    Alk. phosphatase 86 45 - 117 U/L    Protein, total 7.3 6.4 - 8.2 g/dL    Albumin 3.9 3.5 - 5.0 g/dL    Globulin 3.4 2.0 - 4.0 g/dL    A-G Ratio 1.1 1.1 - 2.2         Immunizations administered during this encounter: There is no immunization history on file for this patient. Screening for Metabolic Disorders for Patients on Antipsychotic Medications  (Data obtained from the EMR)    Estimated Body Mass Index  Estimated body mass index is 26.39 kg/m² as calculated from the following:    Height as of this encounter: 6' 1\" (1.854 m). Weight as of this encounter: 90.7 kg (200 lb). Vital Signs/Blood Pressure  Visit Vitals  /67 (BP 1 Location: Left arm, BP Patient Position: Sitting)   Pulse 96   Temp 98.2 °F (36.8 °C)   Resp 16   Ht 6' 1\" (1.854 m)   Wt 90.7 kg (200 lb)   SpO2 100%   BMI 26.39 kg/m²       Blood Glucose/Hemoglobin A1c  Lab Results   Component Value Date/Time    Glucose 127 (H) 12/13/2019 10:22 PM       No results found for: HBA1C, HGBE8, FEQ5WFSW     Lipid Panel  No results found for: CHOL, CHOLX, CHLST, CHOLV, 728569, HDL, HDLP, LDL, LDLC, DLDLP, TGLX, TRIGL, TRIGP, CHHD, CHHDX     Discharge Diagnosis: Please refer to physician's discharge plan.      Discharge Plan: The patient Marilee Renato exhibits the ability to control behavior in a less restrictive environment. Patient's level of functioning is improving. No assaultive/destructive behavior has been observed for the past 24 hours. No suicidal/homicidal threat or behavior has been observed for the past 24 hours. There is no evidence of serious medication side effects. Patient has not been in physical or protective restraints for at least the past 24 hours. If weapons involved, how are they secured? None involved. Is patient aware of and in agreement with discharge plan? Arrangements for medication:  No medications      Copy of discharge instructions to provider?:  [address] for transportation home:  Wife to transport    Keep all follow up appointments as scheduled, continue to take prescribed medications per physician instructions. Mental health crisis number:  699 or your local mental health crisis line number at [phone]. Discharge Medication List and Instructions:   Current Discharge Medication List      START taking these medications    Details   doxycycline (VIBRA-TABS) 100 mg tablet Take 1 Tab by mouth every twelve (12) hours for 5 days. Indications: Infection  Qty: 10 Tab, Refills: 0         CONTINUE these medications which have CHANGED    Details   buPROPion XL (WELLBUTRIN XL) 150 mg tablet Take 1 Tab by mouth daily. Indications: Anxiousness associated with Depression  Qty: 30 Tab, Refills: 0             Unresulted Labs (24h ago, onward)    None        To obtain results of studies pending at discharge, please contact N/A.      Follow-up Information     Follow up With Specialties Details [address] Board  Go in 1 day Walk-in same day access appointments 8:30 AM to 2:30PM  Monday through Friday Address: [facility] [address] [place] [address]                       Phone: [phone]  Fax:  [phone]          Advanced Directive:   Does the patient have an appointed surrogate decision maker? Unknown   Does the patient have a Medical Advance Directive? Unknown   Does the patient have a Psychiatric Advance Directive? Unknown   If the patient does not have a surrogate or Medical Advance Directive AND Psychiatric Advance Directive, the patient was offered information on these advance directives. Unknown     Patient Instructions: Please continue all medications until otherwise directed by physician. Tobacco Cessation Discharge Plan:   Is the patient a smoker and needs referral for smoking cessation? No  Patient referred to the following for smoking cessation with an appointment? No   Patient was offered medication to assist with smoking cessation at discharge? No  Was education for smoking cessation added to the discharge instructions? No     Alcohol/Substance Abuse Discharge Plan:   Does the patient have a history of substance/alcohol abuse and requires a referral for treatment? Yes  Patient referred to the following for substance/alcohol abuse treatment with an appointment? Yes  Patient was offered medication to assist with alcohol cessation at discharge? No  Was education for substance/alcohol abuse added to discharge instructions? Yes     Patient discharged to Home; provided to the patient/caregiver either in hard copy or electronically. Continuing care paperwork was faxed to community mental health providers.

## 2019-12-16 NOTE — PROGRESS NOTES
0800 Pt up to the dayroom for breakfast, then returned to bed. Cooperative with medication and vital signs. Calm, withdrawn. Denies S/I, H/I, AV hallucinations, depression, and anxiety. 1030 Awake in bed. No complaints voiced. 1230 Pt ate lunch and met with . Pt found out his TDO is being dismissed. 1 Pt discharged to wife. Received belongings and valuables. Verbalized understanding of discharge instructions.

## 2019-12-16 NOTE — GROUP NOTE
[facility]ANU  GROUP DOCUMENTATION INDIVIDUAL Group Therapy Note Date: 12/16/2019 Group Start Time: 1100 Group End Time: 1200 Group Topic: Topic Group [address] ACUTE BEHAV [facility], [address] [facility] GROUP DOCUMENTATION GROUP Group Therapy Note Attendees: 6 Attendance: Did not attend Patient's Goal: Interventions/techniques: 
Horacio Delong

## 2019-12-16 NOTE — BH NOTES
1900-Patient report received from Mansi Stephen RN    1945-Patient denies SI/HI and AVH. Patient denies pain. Patient calm and cooperative. Patient is isolative to his room and encouraged to come out to the dayroom.     2030-Patient did not want a snack    2150-Patient was med compliant given prn trazodone for sleep 50 mg po.     0126-Patient resting in their room    0545-Patient slept 11 hours

## 2019-12-16 NOTE — PROGRESS NOTES
Problem: Depressed Mood (Adult/Pediatric)  Goal: *STG: Remains safe in hospital  Outcome: Progressing Towards Goal     Problem: Depressed Mood (Adult/Pediatric)  Goal: *STG: Complies with medication therapy  Outcome: Progressing Towards Goal     Problem: Depressed Mood (Adult/Pediatric)  Goal: *STG: Attends activities and groups  Outcome: Not Progressing Towards Goal

## 2019-12-16 NOTE — DISCHARGE SUMMARY
PSYCHIATRIC DISCHARGE SUMMARY         IDENTIFICATION:    Patient Name  Mahamed Phillips   Date of Birth 1980   [facility] [id number]   Medical Record Number  [id number]      Age  46 y.o. PCP None   Admit date:  2019    Discharge date: 2019   Room Number  65/56  @ [facility]   Date of Service  2019            TYPE OF DISCHARGE: RELEASED BY THE TDO COURT               CONDITION AT DISCHARGE: fair and stable       PROVISIONAL & DISCHARGE DIAGNOSES:    Problem List  Never Reviewed          Codes Class    * (Principal) Major depressive disorder ICD-10-CM: F32.9  ICD-9-CM: 296.20               Active Hospital Problems    *Major depressive disorder        DISCHARGE DIAGNOSIS:   Axis I:  SEE ABOVE  Axis II: SEE ABOVE  Axis III: SEE ABOVE  Axis IV:  lack of structure  Axis V:  20 on admission, 70 on discharge     CC & HISTORY OF PRESENT ILLNESS:  \"emotional lability / TDO\"    HISTORY OF PRESENT ILLNESS:  This is a 20-year-old  male with a history of depression, brought in under a TDO, one prior hospitalization after his daughter  a year ago, where he and his wife fell out of emotional contact with each other. The patient is not suicidal or homicidal.  He was angry. He made a stupid mistake and was compliant with his medications before. He made a stupid mistake when he tried to commit suicide before because of his daughter's death, but he denies any of that now. He has been compliant with his Wellbutrin and has no other concerns, but because of his TDO, he is here and he is going to have his time in court, he says.      SOCIAL HISTORY:    Social History     Socioeconomic History    Marital status: SINGLE     Spouse name: Not on file    Number of children: Not on file    Years of education: Not on file    Highest education level: Not on file   Occupational History    Not on file   Social Needs    Financial resource strain: Not on file    Food insecurity:     Worry: Not on file     Inability: Not on file    Transportation needs:     Medical: Not on file     Non-medical: Not on file   Tobacco Use    Smoking status: Not on file   Substance and Sexual Activity    Alcohol use: Not on file    Drug use: Not on file    Sexual activity: Not on file   Lifestyle    Physical activity:     Days per week: Not on file     Minutes per session: Not on file    Stress: Not on file   Relationships    Social connections:     Talks on phone: Not on file     Gets together: Not on file     Attends Worship service: Not on file     Active member of club or organization: Not on file     Attends meetings of clubs or organizations: Not on file     Relationship status: Not on file    Intimate partner violence:     Fear of current or ex partner: Not on file     Emotionally abused: Not on file     Physically abused: Not on file     Forced sexual activity: Not on file   Other Topics Concern    Not on file   Social History Narrative    Not on file      FAMILY HISTORY:   History reviewed. No pertinent family history. HOSPITALIZATION COURSE:    Drake Larsen was admitted to the inpatient psychiatric unit [facility] for acute psychiatric stabilization in regards to symptomatology as described in the HPI above. The differential diagnosis at time of admission included: MDD vs adjustment disorder. While on the unit Drake Larsen was involved in individual, group, occupational and milieu therapy. Psychiatric medications were adjusted during this hospitalization including Wellbutrin (restarted upon discharge at typical dose). Drake Larsen demonstrated a slow, but progressive improvement in overall condition. Much of patient's initial presentation appeared to be related to situational stressors, effects of medication non-compliance and psychological factors. Please see individual progress notes for more specific details regarding patient's hospitalization course. At time of discharge, Linda Penn is without significant problems of depression, psychosis, or rogelio. Patient free of suicidal and homicidal ideations (appears to be at very low risk of suicide or homicide) and reports many positive predictive factors in terms of not attempting suicide or homicide. Overall presentation at time of discharge is most consistent with the diagnosis of adjustment disorder. Patient has maximized benefit to be derived from acute inpatient psychiatric treatment. All members of the treatment team concur with each other in regards to plans for discharge today. Patient and family are aware and in agreement with discharge and discharge plan. LABS AND IMAGAING:    Labs Reviewed   DRUG SCREEN, URINE - Abnormal; Notable for the following components:       Result Value    AMPHETAMINES POSITIVE (*)     THC (TH-CANNABINOL) POSITIVE (*)     All other components within normal limits   METABOLIC PANEL, COMPREHENSIVE - Abnormal; Notable for the following components:    Glucose 127 (*)     AST (SGOT) 14 (*)     All other components within normal limits   ETHYL ALCOHOL   CBC WITH AUTOMATED DIFF     No results found for: DS35, PHEN, PHENO, PHENT, DILF, DS39, PHENY, PTN, VALF2, VALAC, VALP, VALPR, DS6, CRBAM, CRBAMP, CARB2, XCRBAM  Admission on 12/13/2019   Component Date Value Ref Range Status    ALCOHOL(ETHYL),SERUM 12/13/2019 <10  <10 MG/DL Final    AMPHETAMINES 12/13/2019 POSITIVE* NEG   Final    BARBITURATES 12/13/2019 NEGATIVE   NEG   Final    BENZODIAZEPINES 12/13/2019 NEGATIVE   NEG   Final    COCAINE 12/13/2019 NEGATIVE   NEG   Final    METHADONE 12/13/2019 NEGATIVE   NEG   Final    OPIATES 12/13/2019 NEGATIVE   NEG   Final    PCP(PHENCYCLIDINE) 12/13/2019 NEGATIVE   NEG   Final    THC (TH-CANNABINOL) 12/13/2019 POSITIVE* NEG   Final    Drug screen comment 12/13/2019 (NOTE)   Final    WBC 12/13/2019 7.8  4.1 - 11.1 K/uL Final    RBC 12/13/2019 4.80  4. 10 - 5.70 M/uL Final    HGB 12/13/2019 15.7  12.1 - 17.0 g/dL Final    HCT 12/13/2019 44.5  36.6 - 50.3 % Final    MCV 12/13/2019 92.7  80.0 - 99.0 FL Final    MCH 12/13/2019 32.7  26.0 - 34.0 PG Final    MCHC 12/13/2019 35.3  30.0 - 36.5 g/dL Final    RDW 12/13/2019 13.4  11.5 - 14.5 % Final    PLATELET 58/19/8649 710  150 - 400 K/uL Final    MPV 12/13/2019 11.5  8.9 - 12.9 FL Final    NRBC 12/13/2019 0.0  0  WBC Final    ABSOLUTE NRBC 12/13/2019 0.00  0.00 - 0.01 K/uL Final    NEUTROPHILS 12/13/2019 60  32 - 75 % Final    LYMPHOCYTES 12/13/2019 28  12 - 49 % Final    MONOCYTES 12/13/2019 10  5 - 13 % Final    EOSINOPHILS 12/13/2019 2  0 - 7 % Final    BASOPHILS 12/13/2019 1  0 - 1 % Final    IMMATURE GRANULOCYTES 12/13/2019 0  0.0 - 0.5 % Final    ABS. NEUTROPHILS 12/13/2019 4.7  1.8 - 8.0 K/UL Final    ABS. LYMPHOCYTES 12/13/2019 2.2  0.8 - 3.5 K/UL Final    ABS. MONOCYTES 12/13/2019 0.8  0.0 - 1.0 K/UL Final    ABS. EOSINOPHILS 12/13/2019 0.1  0.0 - 0.4 K/UL Final    ABS. BASOPHILS 12/13/2019 0.0  0.0 - 0.1 K/UL Final    ABS. IMM. GRANS. 12/13/2019 0.0  0.00 - 0.04 K/UL Final    DF 12/13/2019 AUTOMATED    Final    Sodium 12/13/2019 139  136 - 145 mmol/L Final    Potassium 12/13/2019 3.9  3.5 - 5.1 mmol/L Final    Chloride 12/13/2019 102  97 - 108 mmol/L Final    CO2 12/13/2019 28  21 - 32 mmol/L Final    Anion gap 12/13/2019 9  5 - 15 mmol/L Final    Glucose 12/13/2019 127* 65 - 100 mg/dL Final    BUN 12/13/2019 11  6 - 20 MG/DL Final    Creatinine 12/13/2019 0.89  0.70 - 1.30 MG/DL Final    BUN/Creatinine ratio 12/13/2019 12  12 - 20   Final    GFR est AA 12/13/2019 >60  >60 ml/min/1.73m2 Final    GFR est non-AA 12/13/2019 >60  >60 ml/min/1.73m2 Final    Calcium 12/13/2019 9.4  8.5 - 10.1 MG/DL Final    Bilirubin, total 12/13/2019 0.3  0.2 - 1.0 MG/DL Final    ALT (SGPT) 12/13/2019 21  12 - 78 U/L Final    AST (SGOT) 12/13/2019 14* 15 - 37 U/L Final    Alk.  phosphatase 12/13/2019 86  45 - 117 U/L Final    Protein, total 12/13/2019 7.3  6.4 - 8.2 g/dL Final    Albumin 12/13/2019 3.9  3.5 - 5.0 g/dL Final    Globulin 12/13/2019 3.4  2.0 - 4.0 g/dL Final    A-G Ratio 12/13/2019 1.1  1.1 - 2.2   Final     No results found. DISPOSITION:    Home. Patient to f/u with psychiatric and psychotherapy appointments. Patient is to f/u with internist as directed. FOLLOW-UP CARE:    Activity as tolerated  Regular diet  Wound Care: none needed. Follow-up Information     Follow up With Specialties Details Why [address] Board  Go in 1 day Walk-in same day access appointments 8:30 AM to 2:30PM  Monday through Friday [facility] [place]                        Phone: [phone]  Fax:  [phone]      None    None ([phone]) Patient stated that they have no PCP                   PROGNOSIS:   Fair ---- based on nature of patient's pathology/ies and treatment compliance issues. Prognosis is greatly dependent upon patient's ability to remain sober and to follow up with scheduled appointments as well as to comply with psychiatric medications as prescribed. DISCHARGE MEDICATIONS:    Informed consent given for the use of following psychotropic medications:  Current Discharge Medication List      START taking these medications    Details   doxycycline (VIBRA-TABS) 100 mg tablet Take 1 Tab by mouth every twelve (12) hours for 5 days. Indications: Infection  Qty: 10 Tab, Refills: 0         CONTINUE these medications which have CHANGED    Details   buPROPion XL (WELLBUTRIN XL) 150 mg tablet Take 1 Tab by mouth daily.  Indications: Anxiousness associated with Depression  Qty: 30 Tab, Refills: 0                    A coordinated, multidisplinary treatment team round was conducted with Nory Lebron is done daily here at [facility]. This team consists of the nurse, psychiatric unit pharmacist,  and writer. I have spent greater than 35 minutes on discharge work.     Signed:  Stacia Parham MD  12/16/2019

## 2020-05-19 ENCOUNTER — ED HISTORICAL/CONVERTED ENCOUNTER (OUTPATIENT)
Dept: OTHER | Age: 40
End: 2020-05-19

## 2021-01-13 ENCOUNTER — APPOINTMENT (OUTPATIENT)
Dept: GENERAL RADIOLOGY | Age: 41
End: 2021-01-13
Attending: NURSE PRACTITIONER
Payer: MEDICAID

## 2021-01-13 ENCOUNTER — HOSPITAL ENCOUNTER (EMERGENCY)
Age: 41
Discharge: HOME | End: 2021-01-13
Payer: MEDICAID

## 2021-01-13 VITALS
HEART RATE: 54 BPM | DIASTOLIC BLOOD PRESSURE: 72 MMHG | BODY MASS INDEX: 26.51 KG/M2 | OXYGEN SATURATION: 99 % | SYSTOLIC BLOOD PRESSURE: 118 MMHG | TEMPERATURE: 97.5 F | HEIGHT: 73 IN | WEIGHT: 200 LBS | RESPIRATION RATE: 18 BRPM

## 2021-01-13 DIAGNOSIS — T50.904A DRUG OVERDOSE, UNDETERMINED INTENT, INITIAL ENCOUNTER: Primary | ICD-10-CM

## 2021-01-13 DIAGNOSIS — F12.10 CANNABIS ABUSE: ICD-10-CM

## 2021-01-13 DIAGNOSIS — F15.10 METHAMPHETAMINE ABUSE (HCC): ICD-10-CM

## 2021-01-13 DIAGNOSIS — E87.6 HYPOKALEMIA: ICD-10-CM

## 2021-01-13 LAB
ALBUMIN SERPL-MCNC: 3.9 G/DL (ref 3.5–5)
ALBUMIN/GLOB SERPL: 1.3 {RATIO} (ref 1.1–2.2)
ALP SERPL-CCNC: 69 U/L (ref 45–117)
ALT SERPL-CCNC: 39 U/L (ref 12–78)
AMPHET UR QL SCN: POSITIVE
ANION GAP SERPL CALC-SCNC: 6 MMOL/L (ref 5–15)
APAP SERPL-MCNC: 10 UG/ML (ref 10–30)
APAP SERPL-MCNC: 11 UG/ML (ref 10–30)
APPEARANCE UR: CLEAR
AST SERPL W P-5'-P-CCNC: 31 U/L (ref 15–37)
BACTERIA URNS QL MICRO: NEGATIVE /HPF
BARBITURATES UR QL SCN: NEGATIVE
BASOPHILS # BLD: 0 K/UL (ref 0–0.1)
BASOPHILS NFR BLD: 0 % (ref 0–1)
BENZODIAZ UR QL: NEGATIVE
BILIRUB SERPL-MCNC: 0.5 MG/DL (ref 0.2–1)
BILIRUB UR QL: NEGATIVE
BUN SERPL-MCNC: 11 MG/DL (ref 6–20)
BUN/CREAT SERPL: 17 (ref 12–20)
CA-I BLD-MCNC: 8.6 MG/DL (ref 8.5–10.1)
CANNABINOIDS UR QL SCN: POSITIVE
CHLORIDE SERPL-SCNC: 105 MMOL/L (ref 97–108)
CO2 SERPL-SCNC: 27 MMOL/L (ref 21–32)
COCAINE UR QL SCN: NEGATIVE
COLOR UR: ABNORMAL
CREAT SERPL-MCNC: 0.66 MG/DL (ref 0.7–1.3)
DATE LAST DOSE: NORMAL
DATE LAST DOSE: NORMAL
DIFFERENTIAL METHOD BLD: ABNORMAL
DRUG SCRN COMMENT,DRGCM: ABNORMAL
EOSINOPHIL # BLD: 0.1 K/UL (ref 0–0.4)
EOSINOPHIL NFR BLD: 2 % (ref 0–7)
ERYTHROCYTE [DISTWIDTH] IN BLOOD BY AUTOMATED COUNT: 13.6 % (ref 11.5–14.5)
ETHANOL SERPL-MCNC: <4 MG/DL
GLOBULIN SER CALC-MCNC: 3.1 G/DL (ref 2–4)
GLUCOSE SERPL-MCNC: 129 MG/DL (ref 65–100)
GLUCOSE UR STRIP.AUTO-MCNC: NEGATIVE MG/DL
HCT VFR BLD AUTO: 35.6 % (ref 36.6–50.3)
HGB BLD-MCNC: 12.6 G/DL (ref 12.1–17)
HGB UR QL STRIP: NEGATIVE
IMM GRANULOCYTES # BLD AUTO: 0 K/UL (ref 0–0.04)
IMM GRANULOCYTES NFR BLD AUTO: 0 % (ref 0–0.5)
KETONES UR QL STRIP.AUTO: 5 MG/DL
LEUKOCYTE ESTERASE UR QL STRIP.AUTO: NEGATIVE
LYMPHOCYTES # BLD: 2.1 K/UL (ref 0.8–3.5)
LYMPHOCYTES NFR BLD: 36 % (ref 12–49)
MCH RBC QN AUTO: 33.2 PG (ref 26–34)
MCHC RBC AUTO-ENTMCNC: 35.4 G/DL (ref 30–36.5)
MCV RBC AUTO: 93.9 FL (ref 80–99)
METHADONE UR QL: NEGATIVE
MONOCYTES # BLD: 0.6 K/UL (ref 0–1)
MONOCYTES NFR BLD: 10 % (ref 5–13)
MUCOUS THREADS URNS QL MICRO: ABNORMAL /LPF
NEUTS SEG # BLD: 3.1 K/UL (ref 1.8–8)
NEUTS SEG NFR BLD: 52 % (ref 32–75)
NITRITE UR QL STRIP.AUTO: NEGATIVE
OPIATES UR QL: NEGATIVE
PCP UR QL: NEGATIVE
PH UR STRIP: 6 [PH] (ref 5–8)
PLATELET # BLD AUTO: 152 K/UL (ref 150–400)
PMV BLD AUTO: 11.8 FL (ref 8.9–12.9)
POTASSIUM SERPL-SCNC: 3.1 MMOL/L (ref 3.5–5.1)
PROT SERPL-MCNC: 7 G/DL (ref 6.4–8.2)
PROT UR STRIP-MCNC: NEGATIVE MG/DL
RBC # BLD AUTO: 3.79 M/UL (ref 4.1–5.7)
RBC #/AREA URNS HPF: ABNORMAL /HPF (ref 0–5)
REPORTED DOSE,DOSE: NORMAL UNITS
REPORTED DOSE,DOSE: NORMAL UNITS
SALICYLATES SERPL-MCNC: 2.8 MG/DL (ref 2.8–20)
SALICYLATES SERPL-MCNC: 3.1 MG/DL (ref 2.8–20)
SODIUM SERPL-SCNC: 138 MMOL/L (ref 136–145)
SP GR UR REFRACTOMETRY: 1.02 (ref 1–1.03)
UROBILINOGEN UR QL STRIP.AUTO: 0.1 EU/DL (ref 0.1–1)
WBC # BLD AUTO: 5.8 K/UL (ref 4.1–11.1)
WBC URNS QL MICRO: ABNORMAL /HPF (ref 0–4)

## 2021-01-13 PROCEDURE — 99285 EMERGENCY DEPT VISIT HI MDM: CPT

## 2021-01-13 PROCEDURE — 80143 DRUG ASSAY ACETAMINOPHEN: CPT

## 2021-01-13 PROCEDURE — 36415 COLL VENOUS BLD VENIPUNCTURE: CPT

## 2021-01-13 PROCEDURE — 74011250637 HC RX REV CODE- 250/637: Performed by: NURSE PRACTITIONER

## 2021-01-13 PROCEDURE — 85025 COMPLETE CBC W/AUTO DIFF WBC: CPT

## 2021-01-13 PROCEDURE — 71045 X-RAY EXAM CHEST 1 VIEW: CPT

## 2021-01-13 PROCEDURE — 80179 DRUG ASSAY SALICYLATE: CPT

## 2021-01-13 PROCEDURE — 82077 ASSAY SPEC XCP UR&BREATH IA: CPT

## 2021-01-13 PROCEDURE — 80307 DRUG TEST PRSMV CHEM ANLYZR: CPT

## 2021-01-13 PROCEDURE — 81003 URINALYSIS AUTO W/O SCOPE: CPT

## 2021-01-13 PROCEDURE — 93005 ELECTROCARDIOGRAM TRACING: CPT

## 2021-01-13 PROCEDURE — 80053 COMPREHEN METABOLIC PANEL: CPT

## 2021-01-13 RX ORDER — POTASSIUM CHLORIDE 750 MG/1
60 TABLET, FILM COATED, EXTENDED RELEASE ORAL
Status: COMPLETED | OUTPATIENT
Start: 2021-01-13 | End: 2021-01-13

## 2021-01-13 RX ORDER — CALCIUM GLUCONATE 94 MG/ML
1 INJECTION, SOLUTION INTRAVENOUS
Status: DISCONTINUED | OUTPATIENT
Start: 2021-01-13 | End: 2021-01-13 | Stop reason: HOSPADM

## 2021-01-13 RX ADMIN — POTASSIUM CHLORIDE 60 MEQ: 750 TABLET, FILM COATED, EXTENDED RELEASE ORAL at 14:28

## 2021-01-13 NOTE — ED TRIAGE NOTES
Pt arrives by EMS from home. Pt smoked marijuana this morning and wife called EMS with concern of marijuana being laced as Pt was going in and out of responsiveness. EMS gave 2mg narcan and 4mg zofran PTA.  Vitals stable at arrival.

## 2021-01-13 NOTE — ED NOTES
SBIRT CONSULTATION    Pt was assessed for substance use and depression. Pt screened positive on the DAST-10 for substance use with a score of 3. Pt endorsed marijuana usage that he claims dates back to the age of 12. Writer questioned this, but the pt further stated \"well my parent's taught me to cook crack at age 6 so. .\"    Pt was made aware of the numerous adverse health affects that go along with his marijuana usage, including the outcomes that he has already experienced. Pt presented in the ED today after he claims that he smoked some marijuana and began to feel sick and to go in and out of consciousness. Pt also reports that he believes that his marijuana was \"laced\" with something. After checking his toxicology results, writer discovered that the pt also tested for amphetamines which he denies taking. Writer further spoke with pt about his substance use and how it has obviously become dangerous to his health. Pt stated \"I don't think that I will ever stop smoking weed\" and \"this is the first time that this has happened. \" Writer questioned if the pt had changed his source where he obtained his drugs and he states that it is the same stuff that he always buys. Overall, pt was not really receptive to the [facility] but did at least speak with the writer. Pt discharged home from the emergency room.

## 2021-01-13 NOTE — ED PROVIDER NOTES
EMERGENCY DEPARTMENT HISTORY AND PHYSICAL EXAM      Date: 1/13/2021  Patient Name: Becca Martinez    History of Presenting Illness     Chief Complaint   Patient presents with    Drug Overdose       History Provided By: Patient    HPI: Becca Martinez, 45 y.o. male with a past medical history significant Marijuana abuse presents to the ED with cc of overdose. Patient states he smoked marijuana that was laced with an unknown substance. Patient got up this morning and smoked marijuana. After smoking marijuana patient pain during going in and out of consciousness. Patient significant other called EMS. They denied any suicidal or homicidal ideation. Patient alert and oriented but drowsy at time of history. Patient specifically denies fever, chills pain, shortness of breath, abdominal pain, nausea, vomiting, diarrhea. There are no other complaints, changes, or physical findings at this time. PCP: None    No current facility-administered medications on file prior to encounter. Current Outpatient Medications on File Prior to Encounter   Medication Sig Dispense Refill    buPROPion XL (WELLBUTRIN XL) 150 mg tablet Take 1 Tab by mouth daily. Indications: Anxiousness associated with Depression 30 Tab 0       Past History     Past Medical History:  History reviewed. No pertinent past medical history. Past Surgical History:  History reviewed. No pertinent surgical history. Family History:  History reviewed. No pertinent family history. Social History:  Social History     Tobacco Use    Smoking status: Current Every Day Smoker    Smokeless tobacco: Never Used   Substance Use Topics    Alcohol use: Yes    Drug use: Yes     Types: Marijuana       Allergies: Allergies   Allergen Reactions    Pcn [Penicillins] Anaphylaxis         Review of Systems     Review of Systems   Constitutional: Negative for chills and fever. HENT: Negative for dental problem and sore throat.     Eyes: Negative for pain and visual disturbance. Respiratory: Negative for cough and chest tightness. Cardiovascular: Negative for chest pain. Gastrointestinal: Negative for diarrhea and nausea. Genitourinary: Negative for difficulty urinating and frequency. Musculoskeletal: Negative for gait problem and joint swelling. Neurological: Negative for numbness. Hematological: Negative for adenopathy. Does not bruise/bleed easily. Psychiatric/Behavioral: Negative for behavioral problems and suicidal ideas. Physical Exam     Physical Exam  Constitutional:       General: He is not in acute distress. Appearance: Normal appearance. He is not ill-appearing or toxic-appearing. HENT:      Head: Normocephalic and atraumatic. Nose: Nose normal.      Mouth/Throat:      Mouth: Mucous membranes are moist.   Eyes:      Extraocular Movements: Extraocular movements intact. Pupils: Pupils are equal, round, and reactive to light. Neck:      Musculoskeletal: Normal range of motion and neck supple. Cardiovascular:      Rate and Rhythm: Normal rate and regular rhythm. Pulmonary:      Effort: Pulmonary effort is normal.      Breath sounds: Normal breath sounds. Abdominal:      General: Bowel sounds are normal.   Musculoskeletal: Normal range of motion. Skin:     General: Skin is warm and dry. Capillary Refill: Capillary refill takes less than 2 seconds. Neurological:      General: No focal deficit present. Mental Status: He is alert and oriented to person, place, and time.    Psychiatric:         Mood and Affect: Mood normal.         Behavior: Behavior normal.         Lab and Diagnostic Study Results     Labs -     Recent Results (from the past 12 hour(s))   CBC WITH AUTOMATED DIFF    Collection Time: 01/13/21  9:30 AM   Result Value Ref Range    WBC 5.8 4.1 - 11.1 K/uL    RBC 3.79 (L) 4.10 - 5.70 M/uL    HGB 12.6 12.1 - 17.0 g/dL    HCT 35.6 (L) 36.6 - 50.3 %    MCV 93.9 80.0 - 99.0 FL    MCH 33.2 26.0 - 34.0 PG    MCHC 35.4 30.0 - 36.5 g/dL    RDW 13.6 11.5 - 14.5 %    PLATELET 917 593 - 239 K/uL    MPV 11.8 8.9 - 12.9 FL    NEUTROPHILS 52 32 - 75 %    LYMPHOCYTES 36 12 - 49 %    MONOCYTES 10 5 - 13 %    EOSINOPHILS 2 0 - 7 %    BASOPHILS 0 0 - 1 %    IMMATURE GRANULOCYTES 0 0.0 - 0.5 %    ABS. NEUTROPHILS 3.1 1.8 - 8.0 K/UL    ABS. LYMPHOCYTES 2.1 0.8 - 3.5 K/UL    ABS. MONOCYTES 0.6 0.0 - 1.0 K/UL    ABS. EOSINOPHILS 0.1 0.0 - 0.4 K/UL    ABS. BASOPHILS 0.0 0.0 - 0.1 K/UL    ABS. IMM. GRANS. 0.0 0.00 - 0.04 K/UL    DF AUTOMATED     METABOLIC PANEL, COMPREHENSIVE    Collection Time: 01/13/21  9:30 AM   Result Value Ref Range    Sodium 138 136 - 145 mmol/L    Potassium 3.1 (L) 3.5 - 5.1 mmol/L    Chloride 105 97 - 108 mmol/L    CO2 27 21 - 32 mmol/L    Anion gap 6 5 - 15 mmol/L    Glucose 129 (H) 65 - 100 mg/dL    BUN 11 6 - 20 mg/dL    Creatinine 0.66 (L) 0.70 - 1.30 mg/dL    BUN/Creatinine ratio 17 12 - 20      GFR est AA >60 >60 ml/min/1.73m2    GFR est non-AA >60 >60 ml/min/1.73m2    Calcium 8.6 8.5 - 10.1 mg/dL    Bilirubin, total 0.5 0.2 - 1.0 mg/dL    AST (SGOT) 31 15 - 37 U/L    ALT (SGPT) 39 12 - 78 U/L    Alk.  phosphatase 69 45 - 117 U/L    Protein, total 7.0 6.4 - 8.2 g/dL    Albumin 3.9 3.5 - 5.0 g/dL    Globulin 3.1 2.0 - 4.0 g/dL    A-G Ratio 1.3 1.1 - 2.2     ETHYL ALCOHOL    Collection Time: 01/13/21  9:30 AM   Result Value Ref Range    ALCOHOL(ETHYL),SERUM <4 <19 mg/dL   SALICYLATE    Collection Time: 01/13/21  9:30 AM   Result Value Ref Range    Salicylate level 3.1 2.8 - 20.0 mg/dL    Reported dose date Blood      Reported dose: Blood Units   ACETAMINOPHEN    Collection Time: 01/13/21  9:30 AM   Result Value Ref Range    Acetaminophen level 11 10 - 30 ug/mL    Reported dose date Blood      Reported dose: Blood Units   URINALYSIS W/ RFLX MICROSCOPIC    Collection Time: 01/13/21 10:15 AM   Result Value Ref Range    Color Yellow/Straw      Appearance Clear Clear      Specific gravity 1.019 1.003 - 1.030      pH (UA) 6.0 5.0 - 8.0      Protein Negative Negative mg/dL    Glucose Negative Negative mg/dL    Ketone 5 (A) Negative mg/dL    Bilirubin Negative Negative      Blood Negative Negative      Urobilinogen 0.1 0.1 - 1.0 EU/dL    Nitrites Negative Negative      Leukocyte Esterase Negative Negative      WBC 0-4 0 - 4 /hpf    RBC 0-5 0 - 5 /hpf    Bacteria Negative Negative /hpf    Mucus Trace /lpf   DRUG SCREEN, URINE    Collection Time: 01/13/21 10:15 AM   Result Value Ref Range    AMPHETAMINES Positive (A) Negative      BARBITURATES Negative Negative      BENZODIAZEPINES Negative Negative      COCAINE Negative Negative      METHADONE Negative Negative      OPIATES Negative Negative      PCP(PHENCYCLIDINE) Negative Negative      THC (TH-CANNABINOL) Positive (A) Negative      Drug screen comment        This test is a screen for drugs of abuse in a medical setting only (i.e., they are unconfirmed results and as such must not be used for non-medical purposes, e.g.,employment testing, legal testing). Due to its inherent nature, false positive (FP) and false negative (FN) results may be obtained. Therefore, if necessary for medical care, recommend confirmation of positive findings by GC/MS. SALICYLATE    Collection Time: 01/13/21 11:45 AM   Result Value Ref Range    Salicylate level 2.8 2.8 - 20.0 mg/dL   ACETAMINOPHEN    Collection Time: 01/13/21 11:45 AM   Result Value Ref Range    Acetaminophen level 10 10 - 30 ug/mL       Radiologic Studies -   @lastxrresult@  CT Results  (Last 48 hours)    None        CXR Results  (Last 48 hours)               01/13/21 1041  XR CHEST PORT Final result    Impression:  Findings/impression:       No consolidative airspace disease, pleural effusion or pneumothorax. Cardiomediastinal contours are within normal limits. No pulmonary edema. No acute osseous abnormality identified.            Narrative:  Study: XR CHEST PORT       Clinical indication: sob Comparison: Acute abdominal series 9/6/2015. Medical Decision Making   - I am the first provider for this patient. - I reviewed the vital signs, available nursing notes, past medical history, past surgical history, family history and social history. - Initial assessment performed. The patients presenting problems have been discussed, and they are in agreement with the care plan formulated and outlined with them. I have encouraged them to ask questions as they arise throughout their visit. Vital Signs-Reviewed the patient's vital signs. Patient Vitals for the past 12 hrs:   Temp Pulse Resp BP SpO2   01/13/21 1414  (!) 54 18 118/72 99 %   01/13/21 0957     100 %   01/13/21 0951 97.5 °F (36.4 °C) (!) 46 16 105/67 100 %       Records Reviewed: Nursing Notes and Old Medical Records          ED Course:          Provider Notes (Medical Decision Making):   Patient presents with overdose. Differential diagnosis include suicidal ideation, overdose, intentional overdose, substance abuse. Patient's Tylenol and salicylate level has been decreasing. No sign of endorgan damage patient able to maintain airway. Patient will be discharged home  MDM       Procedures   Medical Decision Makingedical Decision Making  Performed by: Gary Godinez NP  PROCEDURES:  Procedures       Disposition   Disposition: DC- Adult Discharges: All of the diagnostic tests were reviewed and questions answered. Diagnosis, care plan and treatment options were discussed. The patient understands the instructions and will follow up as directed. The patients results have been reviewed with them. They have been counseled regarding their diagnosis. The patient verbally convey understanding and agreement of the signs, symptoms, diagnosis, treatment and prognosis and additionally agrees to follow up as recommended with their PCP in 24 - 48 hours.   They also agree with the care-plan and convey that all of their questions have been answered. I have also put together some discharge instructions for them that include: 1) educational information regarding their diagnosis, 2) how to care for their diagnosis at home, as well a 3) list of reasons why they would want to return to the ED prior to their follow-up appointment, should their condition change. DISCHARGE PLAN:  1. Current Discharge Medication List      CONTINUE these medications which have NOT CHANGED    Details   buPROPion XL (WELLBUTRIN XL) 150 mg tablet Take 1 Tab by mouth daily. Indications: Anxiousness associated with Depression  Qty: 30 Tab, Refills: 0           2. Follow-up Information    None       3. Return to ED if worse   4. Current Discharge Medication List            Diagnosis     Clinical Impression:   1. Drug overdose, undetermined intent, initial encounter    2. Hypokalemia    3. Cannabis abuse    4. Methamphetamine abuse ([facility] [address]        Attestations:    Abby Gonzales NP    Please note that this dictation was completed with [employer], the computer voice recognition software. Quite often unanticipated grammatical, syntax, homophones, and other interpretive errors are inadvertently transcribed by the computer software. Please disregard these errors. Please excuse any errors that have escaped final proofreading. Thank you.

## 2021-01-13 NOTE — BSMART NOTE
[address] INTAKE ASSESSMENT    Pt assessed face to face in [place] in ED. Pt presented to ED via EMS after smoking weed and had chest pains and pains in the back of his neck. Pt denies OD or attempted OD and is here for medical reasons only he states. Pt denies SI HI Hallucinations. Pt states he feels safe to d/c home. Pt denies access to weapons  Substance abuse includes weed daily in unknown amt as is varies from day to day  Pt states he has had 1 IP BH Sosp at [facility] s/p OD after his daughter age 13 committed suicide ast year due to being bullied at school. Pt denies psychiatrist  Pt denies legal issues  Pt denies trauma h/o  PMH includes COPD  Pt lives in [place] with his wife who is his support  Medications pt denies  Allergies PCN  Dr. Luis Carlos Thomas called and states pt does not meet criteria for IP BH and to have pt f/u with PCP. Pt was also assesed by [facility] SBIRT. Gurpreet Styles NP ED notified.

## 2021-01-15 LAB
ATRIAL RATE: 57 BPM
CALCULATED R AXIS, ECG10: 56 DEGREES
CALCULATED T AXIS, ECG11: 35 DEGREES
DIAGNOSIS, 93000: NORMAL
P-R INTERVAL, ECG05: 154 MS
Q-T INTERVAL, ECG07: 478 MS
QRS DURATION, ECG06: 138 MS
QTC CALCULATION (BEZET), ECG08: 465 MS
VENTRICULAR RATE, ECG03: 57 BPM

## 2021-05-05 ENCOUNTER — TELEPHONE (OUTPATIENT)
Dept: FAMILY MEDICINE CLINIC | Age: 41
End: 2021-05-05

## 2022-02-02 ENCOUNTER — APPOINTMENT (OUTPATIENT)
Dept: GENERAL RADIOLOGY | Age: 42
End: 2022-02-02
Attending: EMERGENCY MEDICINE
Payer: MEDICAID

## 2022-02-02 ENCOUNTER — HOSPITAL ENCOUNTER (EMERGENCY)
Age: 42
Discharge: HOME | End: 2022-02-02
Attending: EMERGENCY MEDICINE
Payer: MEDICAID

## 2022-02-02 VITALS
BODY MASS INDEX: 24.52 KG/M2 | WEIGHT: 185 LBS | SYSTOLIC BLOOD PRESSURE: 156 MMHG | OXYGEN SATURATION: 100 % | RESPIRATION RATE: 18 BRPM | HEART RATE: 101 BPM | DIASTOLIC BLOOD PRESSURE: 87 MMHG | TEMPERATURE: 98.8 F | HEIGHT: 73 IN

## 2022-02-02 DIAGNOSIS — L03.114 CELLULITIS OF LEFT HAND: Primary | ICD-10-CM

## 2022-02-02 PROCEDURE — 74011250637 HC RX REV CODE- 250/637: Performed by: EMERGENCY MEDICINE

## 2022-02-02 PROCEDURE — 74011636637 HC RX REV CODE- 636/637: Performed by: EMERGENCY MEDICINE

## 2022-02-02 PROCEDURE — 73130 X-RAY EXAM OF HAND: CPT

## 2022-02-02 PROCEDURE — 99283 EMERGENCY DEPT VISIT LOW MDM: CPT

## 2022-02-02 RX ORDER — IBUPROFEN 800 MG/1
800 TABLET ORAL
Qty: 20 TABLET | Refills: 0 | Status: SHIPPED | OUTPATIENT
Start: 2022-02-02 | End: 2022-02-09

## 2022-02-02 RX ORDER — PREDNISONE 20 MG/1
60 TABLET ORAL ONCE
Status: COMPLETED | OUTPATIENT
Start: 2022-02-02 | End: 2022-02-02

## 2022-02-02 RX ORDER — IBUPROFEN 800 MG/1
800 TABLET ORAL ONCE
Status: COMPLETED | OUTPATIENT
Start: 2022-02-02 | End: 2022-02-02

## 2022-02-02 RX ORDER — CLINDAMYCIN HYDROCHLORIDE 300 MG/1
300 CAPSULE ORAL 4 TIMES DAILY
Qty: 28 CAPSULE | Refills: 0 | Status: SHIPPED | OUTPATIENT
Start: 2022-02-02 | End: 2022-02-09

## 2022-02-02 RX ORDER — CLINDAMYCIN HYDROCHLORIDE 150 MG/1
300 CAPSULE ORAL ONCE
Status: COMPLETED | OUTPATIENT
Start: 2022-02-02 | End: 2022-02-02

## 2022-02-02 RX ORDER — ACETAMINOPHEN 500 MG
1000 TABLET ORAL ONCE
Status: COMPLETED | OUTPATIENT
Start: 2022-02-02 | End: 2022-02-02

## 2022-02-02 RX ADMIN — CLINDAMYCIN HYDROCHLORIDE 300 MG: 150 CAPSULE ORAL at 01:56

## 2022-02-02 RX ADMIN — IBUPROFEN 800 MG: 800 TABLET ORAL at 01:56

## 2022-02-02 RX ADMIN — ACETAMINOPHEN 1000 MG: 500 TABLET ORAL at 01:56

## 2022-02-02 RX ADMIN — PREDNISONE 60 MG: 20 TABLET ORAL at 01:56

## 2022-02-02 NOTE — ED PROVIDER NOTES
EMERGENCY DEPARTMENT HISTORY AND PHYSICAL EXAM      Date: 2/2/2022  Patient Name: Andrea Peters    History of Presenting Illness     Chief Complaint   Patient presents with    Insect Bite    Hand Swelling       History Provided By: Patient    HPI: Andrea Peters, 37 y.o. male   presents to the ED with cc of hand swelling. Patient complains of left hand swelling about 10 hours ago after he came back from work. Patient is unsure whether he was bitten by an insect on his left second finger prior to the onset of swelling. No fever chills. No paresthesia of the fingers. No other joint pain. Tetanus is up-to-date. PCP: None    No current facility-administered medications on file prior to encounter. Current Outpatient Medications on File Prior to Encounter   Medication Sig Dispense Refill    buPROPion XL (WELLBUTRIN XL) 150 mg tablet Take 1 Tab by mouth daily. Indications: Anxiousness associated with Depression 30 Tab 0       Past History     Past Medical History:  History reviewed. No pertinent past medical history. Past Surgical History:  History reviewed. No pertinent surgical history. Family History:  History reviewed. No pertinent family history. Social History:  Social History     Tobacco Use    Smoking status: Current Every Day Smoker    Smokeless tobacco: Never Used   Substance Use Topics    Alcohol use: Yes    Drug use: Yes     Types: Marijuana       Allergies: Allergies   Allergen Reactions    Pcn [Penicillins] Anaphylaxis         Review of Systems   Review of Systems   Constitutional: Negative for fever. Respiratory: Negative for cough. Cardiovascular: Negative for chest pain. Gastrointestinal: Negative for abdominal pain. Skin: Positive for wound. Physical Exam   Physical Exam  Vitals and nursing note reviewed. Constitutional:       General: He is not in acute distress. Appearance: He is well-developed.  He is not ill-appearing, toxic-appearing or diaphoretic. HENT:      Head: Normocephalic and atraumatic. Mouth/Throat:      Mouth: Mucous membranes are moist.   Eyes:      Conjunctiva/sclera: Conjunctivae normal.   Cardiovascular:      Rate and Rhythm: Normal rate and regular rhythm. Pulmonary:      Effort: Pulmonary effort is normal.      Breath sounds: Normal breath sounds. Abdominal:      General: Bowel sounds are normal.      Palpations: Abdomen is soft. Musculoskeletal:      Cervical back: Neck supple. Comments: Left hand with small punctate granulated lesion on second finger without underlying fluctuance or discharge. Left hand is mildly swollen erythematous and warm. No red streaking. Left wrist normal.   Skin:     General: Skin is warm and dry. Neurological:      General: No focal deficit present. Mental Status: He is alert. Psychiatric:         Mood and Affect: Mood normal.         Diagnostic Study Results     Labs -   No results found for this or any previous visit (from the past 12 hour(s)). Radiologic Studies -   XR HAND LT MIN 3 V   Final Result   No specific acute or active process. CT Results  (Last 48 hours)    None        CXR Results  (Last 48 hours)    None            Medical Decision Making   I am the first provider for this patient. I reviewed the vital signs, available nursing notes, past medical history, past surgical history, family history and social history. Vital Signs-Reviewed the patient's vital signs. Patient Vitals for the past 12 hrs:   Temp Pulse Resp BP SpO2   02/02/22 0137 98.8 °F (37.1 °C) (!) 101 18 (!) 156/87 100 %       Records Reviewed:     Provider Notes (Medical Decision Making):   I discussed the extensive with the patient about the importance of compliance antibiotic and elevation of the hand. Patient is advised to return to emergency room within next 24 hours if the swelling and erythema worsens. ED Course:   Initial assessment performed.  The patients presenting problems have been discussed, and they are in agreement with the care plan formulated and outlined with them. I have encouraged them to ask questions as they arise throughout their visit. PROCEDURES      Disposition: Condition stable   DC- Adult Discharges: All of the diagnostic tests were reviewed and questions answered. Diagnosis, care plan and treatment options were discussed. understand instructions and will follow up as directed. The patients results have been reviewed with them. They have been counseled regarding their diagnosis. The patient verbally convey understanding and agreement of the signs, symptoms, diagnosis, treatment and prognosis and additionally agrees to follow up as recommended. They also agree with the care-plan and convey that all of their questions have been answered. I have also put together some discharge instructions for them that include: 1) educational information regarding their diagnosis, 2) how to care for their diagnosis at home, as well a 3) list of reasons why they would want to return to the ED prior to their follow-up appointment, should their condition change. PLAN:  1. Current Discharge Medication List      START taking these medications    Details   ibuprofen (MOTRIN) 800 mg tablet Take 1 Tablet by mouth every eight (8) hours as needed for Pain for up to 7 days. Qty: 20 Tablet, Refills: 0  Start date: 2/2/2022, End date: 2/9/2022      clindamycin (CLEOCIN) 300 mg capsule Take 1 Capsule by mouth four (4) times daily for 7 days. Qty: 28 Capsule, Refills: 0  Start date: 2/2/2022, End date: 2/9/2022           2. Follow-up Information     Follow up With Specialties Details Why Contact Info    Follow up with your primary care physician  Schedule an appointment as soon as possible for a visit in 3 days As needed         Return to ED if worse     Diagnosis     Clinical Impression:   1.  Cellulitis of left hand        Please note that this dictation was completed with Dragon, the computer voice recognition software. Quite often unanticipated grammatical, syntax, homophones, and other interpretive errors are inadvertently transcribed by the computer software. Please disregard these errors. Please excuse any errors that have escaped final proofreading. Thank you.

## 2022-02-02 NOTE — Clinical Note
[address] [facility] EMERGENCY DEPARTMENT  [address] [place] [address]  [phone]    Work/School Note    Date: 2/2/2022    To Whom It May concern:    Buck Robles was seen and treated today in the emergency room by the following provider(s):  Attending Provider: Toni Saldivar MD.      Buck Robles is excused from work/school on 2/2/2022 through 2/4/2022. He is medically clear to return to work/school on 2/5/2022.          Sincerely,          Demar Aldridge MD

## 2022-02-10 ENCOUNTER — HOSPITAL ENCOUNTER (INPATIENT)
Age: 42
LOS: 4 days | Discharge: HOME | DRG: 951 | End: 2022-02-15
Attending: EMERGENCY MEDICINE | Admitting: INTERNAL MEDICINE
Payer: MEDICAID

## 2022-02-10 ENCOUNTER — APPOINTMENT (OUTPATIENT)
Dept: GENERAL RADIOLOGY | Age: 42
DRG: 951 | End: 2022-02-10
Attending: EMERGENCY MEDICINE
Payer: MEDICAID

## 2022-02-10 DIAGNOSIS — L02.519 ABSCESS OF HAND: Primary | ICD-10-CM

## 2022-02-10 LAB
ALBUMIN SERPL-MCNC: 3.3 G/DL (ref 3.5–5)
ALBUMIN/GLOB SERPL: 0.7 {RATIO} (ref 1.1–2.2)
ALP SERPL-CCNC: 82 U/L (ref 45–117)
ALT SERPL-CCNC: 15 U/L (ref 12–78)
ANION GAP SERPL CALC-SCNC: 7 MMOL/L (ref 5–15)
AST SERPL W P-5'-P-CCNC: 10 U/L (ref 15–37)
BASOPHILS # BLD: 0 K/UL (ref 0–0.1)
BASOPHILS NFR BLD: 0 % (ref 0–1)
BILIRUB SERPL-MCNC: 0.2 MG/DL (ref 0.2–1)
BUN SERPL-MCNC: 7 MG/DL (ref 6–20)
BUN/CREAT SERPL: 9 (ref 12–20)
CA-I BLD-MCNC: 9.1 MG/DL (ref 8.5–10.1)
CHLORIDE SERPL-SCNC: 101 MMOL/L (ref 97–108)
CO2 SERPL-SCNC: 27 MMOL/L (ref 21–32)
CREAT SERPL-MCNC: 0.74 MG/DL (ref 0.7–1.3)
DIFFERENTIAL METHOD BLD: ABNORMAL
EOSINOPHIL # BLD: 0.2 K/UL (ref 0–0.4)
EOSINOPHIL NFR BLD: 2 % (ref 0–7)
ERYTHROCYTE [DISTWIDTH] IN BLOOD BY AUTOMATED COUNT: 13.1 % (ref 11.5–14.5)
GLOBULIN SER CALC-MCNC: 4.5 G/DL (ref 2–4)
GLUCOSE SERPL-MCNC: 92 MG/DL (ref 65–100)
HCT VFR BLD AUTO: 41 % (ref 36.6–50.3)
HGB BLD-MCNC: 14.3 G/DL (ref 12.1–17)
IMM GRANULOCYTES # BLD AUTO: 0.1 K/UL (ref 0–0.04)
IMM GRANULOCYTES NFR BLD AUTO: 1 % (ref 0–0.5)
LYMPHOCYTES # BLD: 2.4 K/UL (ref 0.8–3.5)
LYMPHOCYTES NFR BLD: 23 % (ref 12–49)
MCH RBC QN AUTO: 32.9 PG (ref 26–34)
MCHC RBC AUTO-ENTMCNC: 34.9 G/DL (ref 30–36.5)
MCV RBC AUTO: 94.3 FL (ref 80–99)
MONOCYTES # BLD: 1.3 K/UL (ref 0–1)
MONOCYTES NFR BLD: 12 % (ref 5–13)
NEUTS SEG # BLD: 6.7 K/UL (ref 1.8–8)
NEUTS SEG NFR BLD: 62 % (ref 32–75)
PLATELET # BLD AUTO: 254 K/UL (ref 150–400)
PMV BLD AUTO: 10.8 FL (ref 8.9–12.9)
POTASSIUM SERPL-SCNC: 4 MMOL/L (ref 3.5–5.1)
PROT SERPL-MCNC: 7.8 G/DL (ref 6.4–8.2)
RBC # BLD AUTO: 4.35 M/UL (ref 4.1–5.7)
SODIUM SERPL-SCNC: 135 MMOL/L (ref 136–145)
WBC # BLD AUTO: 10.7 K/UL (ref 4.1–11.1)

## 2022-02-10 PROCEDURE — 36415 COLL VENOUS BLD VENIPUNCTURE: CPT

## 2022-02-10 PROCEDURE — 80053 COMPREHEN METABOLIC PANEL: CPT

## 2022-02-10 PROCEDURE — 85025 COMPLETE CBC W/AUTO DIFF WBC: CPT

## 2022-02-10 PROCEDURE — 99284 EMERGENCY DEPT VISIT MOD MDM: CPT

## 2022-02-10 PROCEDURE — 74011250637 HC RX REV CODE- 250/637: Performed by: EMERGENCY MEDICINE

## 2022-02-10 PROCEDURE — 87040 BLOOD CULTURE FOR BACTERIA: CPT

## 2022-02-10 PROCEDURE — 74011250636 HC RX REV CODE- 250/636: Performed by: EMERGENCY MEDICINE

## 2022-02-10 PROCEDURE — 87205 SMEAR GRAM STAIN: CPT

## 2022-02-10 PROCEDURE — 73130 X-RAY EXAM OF HAND: CPT

## 2022-02-10 PROCEDURE — 96374 THER/PROPH/DIAG INJ IV PUSH: CPT

## 2022-02-10 PROCEDURE — 87147 CULTURE TYPE IMMUNOLOGIC: CPT

## 2022-02-10 RX ORDER — ACETAMINOPHEN 500 MG
1000 TABLET ORAL ONCE
Status: COMPLETED | OUTPATIENT
Start: 2022-02-10 | End: 2022-02-10

## 2022-02-10 RX ORDER — IBUPROFEN 800 MG/1
800 TABLET ORAL ONCE
Status: COMPLETED | OUTPATIENT
Start: 2022-02-10 | End: 2022-02-10

## 2022-02-10 RX ORDER — VANCOMYCIN/0.9 % SOD CHLORIDE 1.5G/250ML
1500 PLASTIC BAG, INJECTION (ML) INTRAVENOUS ONCE
Status: DISCONTINUED | OUTPATIENT
Start: 2022-02-10 | End: 2022-02-10

## 2022-02-10 RX ADMIN — IBUPROFEN 800 MG: 800 TABLET, FILM COATED ORAL at 21:24

## 2022-02-10 RX ADMIN — VANCOMYCIN HYDROCHLORIDE 1000 MG: 1 INJECTION, POWDER, LYOPHILIZED, FOR SOLUTION INTRAVENOUS at 23:12

## 2022-02-10 RX ADMIN — ACETAMINOPHEN 1000 MG: 500 TABLET ORAL at 21:24

## 2022-02-10 NOTE — Clinical Note
Status[de-identified] INPATIENT [101]   Type of Bed: Medical [8]   Cardiac Monitoring Required?: No   Inpatient Hospitalization Certified Necessary for the Following Reasons: 3.  Patient receiving treatment that can only be provided in an inpatient setting (further clarification in H&P documentation)   Admitting Diagnosis: Abscess of hand [536346]   Admitting Physician: Bryant PARHAM [facility] [[id number]]   Attending Physician: Ronna Dominugez [[id number]]   Estimated Length of Stay: 2 Midnights   Discharge Plan[de-identified] Home with Office Follow-up

## 2022-02-11 PROBLEM — L02.519 ABSCESS OF HAND: Status: ACTIVE | Noted: 2022-02-11

## 2022-02-11 LAB
CRP SERPL-MCNC: 4.94 MG/DL (ref 0–0.6)
FLUAV RNA SPEC QL NAA+PROBE: NOT DETECTED
FLUBV RNA SPEC QL NAA+PROBE: NOT DETECTED
MRSA DNA SPEC QL NAA+PROBE: NOT DETECTED
PROCALCITONIN SERPL-MCNC: <0.05 NG/ML
SARS-COV-2, COV2: NOT DETECTED

## 2022-02-11 PROCEDURE — 74011000250 HC RX REV CODE- 250: Performed by: INTERNAL MEDICINE

## 2022-02-11 PROCEDURE — 65270000029 HC RM PRIVATE

## 2022-02-11 PROCEDURE — 74011250637 HC RX REV CODE- 250/637: Performed by: INTERNAL MEDICINE

## 2022-02-11 PROCEDURE — 87205 SMEAR GRAM STAIN: CPT

## 2022-02-11 PROCEDURE — 74011250636 HC RX REV CODE- 250/636: Performed by: INTERNAL MEDICINE

## 2022-02-11 PROCEDURE — 87636 SARSCOV2 & INF A&B AMP PRB: CPT

## 2022-02-11 PROCEDURE — 87641 MR-STAPH DNA AMP PROBE: CPT

## 2022-02-11 PROCEDURE — 99223 1ST HOSP IP/OBS HIGH 75: CPT | Performed by: INTERNAL MEDICINE

## 2022-02-11 PROCEDURE — 84145 PROCALCITONIN (PCT): CPT

## 2022-02-11 PROCEDURE — 87147 CULTURE TYPE IMMUNOLOGIC: CPT

## 2022-02-11 PROCEDURE — 86140 C-REACTIVE PROTEIN: CPT

## 2022-02-11 RX ORDER — ACETAMINOPHEN 325 MG/1
650 TABLET ORAL
Status: DISCONTINUED | OUTPATIENT
Start: 2022-02-11 | End: 2022-02-11

## 2022-02-11 RX ORDER — OXYCODONE AND ACETAMINOPHEN 10; 325 MG/1; MG/1
1 TABLET ORAL
Status: DISCONTINUED | OUTPATIENT
Start: 2022-02-11 | End: 2022-02-15 | Stop reason: HOSPADM

## 2022-02-11 RX ORDER — SODIUM CHLORIDE 0.9 % (FLUSH) 0.9 %
5-40 SYRINGE (ML) INJECTION EVERY 8 HOURS
Status: DISCONTINUED | OUTPATIENT
Start: 2022-02-11 | End: 2022-02-15

## 2022-02-11 RX ORDER — ACETAMINOPHEN 650 MG/1
650 SUPPOSITORY RECTAL
Status: DISCONTINUED | OUTPATIENT
Start: 2022-02-11 | End: 2022-02-11

## 2022-02-11 RX ORDER — POLYETHYLENE GLYCOL 3350 17 G/17G
17 POWDER, FOR SOLUTION ORAL DAILY PRN
Status: DISCONTINUED | OUTPATIENT
Start: 2022-02-11 | End: 2022-02-15 | Stop reason: HOSPADM

## 2022-02-11 RX ORDER — ENOXAPARIN SODIUM 100 MG/ML
40 INJECTION SUBCUTANEOUS DAILY
Status: DISCONTINUED | OUTPATIENT
Start: 2022-02-11 | End: 2022-02-15 | Stop reason: HOSPADM

## 2022-02-11 RX ORDER — ONDANSETRON 4 MG/1
4 TABLET, ORALLY DISINTEGRATING ORAL
Status: DISCONTINUED | OUTPATIENT
Start: 2022-02-11 | End: 2022-02-15 | Stop reason: HOSPADM

## 2022-02-11 RX ORDER — SODIUM CHLORIDE 0.9 % (FLUSH) 0.9 %
5-40 SYRINGE (ML) INJECTION AS NEEDED
Status: DISCONTINUED | OUTPATIENT
Start: 2022-02-11 | End: 2022-02-15 | Stop reason: HOSPADM

## 2022-02-11 RX ORDER — ONDANSETRON 2 MG/ML
4 INJECTION INTRAMUSCULAR; INTRAVENOUS
Status: DISCONTINUED | OUTPATIENT
Start: 2022-02-11 | End: 2022-02-15 | Stop reason: HOSPADM

## 2022-02-11 RX ORDER — OXYCODONE AND ACETAMINOPHEN 5; 325 MG/1; MG/1
1 TABLET ORAL
Status: DISCONTINUED | OUTPATIENT
Start: 2022-02-11 | End: 2022-02-11

## 2022-02-11 RX ADMIN — ENOXAPARIN SODIUM 40 MG: 100 INJECTION SUBCUTANEOUS at 08:56

## 2022-02-11 RX ADMIN — OXYCODONE HYDROCHLORIDE AND ACETAMINOPHEN 1 TABLET: 5; 325 TABLET ORAL at 17:38

## 2022-02-11 RX ADMIN — VANCOMYCIN HYDROCHLORIDE 1750 MG: 1 INJECTION, POWDER, LYOPHILIZED, FOR SOLUTION INTRAVENOUS at 12:31

## 2022-02-11 RX ADMIN — SODIUM CHLORIDE, PRESERVATIVE FREE 10 ML: 5 INJECTION INTRAVENOUS at 06:14

## 2022-02-11 RX ADMIN — OXYCODONE HYDROCHLORIDE AND ACETAMINOPHEN 1 TABLET: 5; 325 TABLET ORAL at 06:14

## 2022-02-11 RX ADMIN — OXYCODONE AND ACETAMINOPHEN 1 TABLET: 10; 325 TABLET ORAL at 21:27

## 2022-02-11 RX ADMIN — OXYCODONE HYDROCHLORIDE AND ACETAMINOPHEN 1 TABLET: 5; 325 TABLET ORAL at 12:31

## 2022-02-11 RX ADMIN — SODIUM CHLORIDE, PRESERVATIVE FREE 1 G: 5 INJECTION INTRAVENOUS at 16:30

## 2022-02-11 RX ADMIN — CEFEPIME HYDROCHLORIDE 1 G: 1 INJECTION, POWDER, FOR SOLUTION INTRAMUSCULAR; INTRAVENOUS at 06:14

## 2022-02-11 RX ADMIN — SODIUM CHLORIDE, PRESERVATIVE FREE 10 ML: 5 INJECTION INTRAVENOUS at 21:29

## 2022-02-11 NOTE — H&P
History and Physical    Patient: Robb Isaacs MRN: [id number]  SSN: [id number]    YOB: 1980  Age: 37 y.o. Sex: male      Subjective:      Robb Isaacs is a 37 y.o. male with no significant past medical history presents to the ED with chief complaint of left hand pain. He reports sustained trauma at work (crawling on a house), injured himself and was a dirty wound. He was seen in outside ED a week ago and discharged with clindamycin. However the area has become progressively more painful and swollen, and started to spontaneously drain pus today. Also noted streaking up his arm. He otherwise denies fever or chills. In the ED, vital signs were normal, no leukocytosis. X-ray of and showed no osseous abnormalities. Vancomycin was given. History reviewed. No pertinent past medical history. History reviewed. No pertinent surgical history. History reviewed. No pertinent family history. Social History     Tobacco Use    Smoking status: Current Every Day Smoker    Smokeless tobacco: Never Used   Substance Use Topics    Alcohol use: Yes      Prior to Admission medications    Medication Sig Start Date End Date Taking? Authorizing Provider   buPROPion XL (WELLBUTRIN XL) 150 mg tablet Take 1 Tab by mouth daily.  Indications: Anxiousness associated with Depression  Patient not taking: Reported on 2/10/2022 12/16/19   Niles Klein MD        Allergies   Allergen Reactions    Pcn [Penicillins] Anaphylaxis       Review of Systems:  Constitutional: No fevers, No chills, No fatigue, No weakness  Eyes: No visual disturbance  Ears, Nose, Mouth, Throat, and Face: No nasal congestion, No sore throat  Respiratory: No cough, No sputum, No wheezing, No SOB  Cardiovascular: No chest pain, No lower extremity edema, No Palpitations   Gastrointestinal: No nausea, No vomiting, No diarrhea, No constipation, No abdominal pain  Genitourinary: No frequency, No dysuria, No hematuria  Integument/Breast: No rash, No skin lesion(s), No dryness  Musculoskeletal: See HPI  Neurological: No headaches, No dizziness, No confusion,  No seizures  Behavioral/Psychiatric: No anxiety, No depression      Objective:     Vitals:    02/10/22 2022 02/11/22 0213 02/11/22 0524   BP: (!) 151/115 123/65 119/76   Pulse: 83 65 71   Resp: 18 16 18   Temp: 98.3 °F (36.8 °C)  98.1 °F (36.7 °C)   SpO2: 100% 98% 97%   Weight: 86.2 kg (190 lb)  86.6 kg (191 lb)   Height: 6' 1\" (1.854 m)  6' 1\" (1.854 m)        Physical Exam:  General: alert, cooperative, no distress  Eye: conjunctivae/corneas clear. PERRL, EOM's intact. Throat and Neck: normal and no erythema or exudates noted. No mass   Lung: clear to auscultation bilaterally  Heart: regular rate and rhythm,   Abdomen: soft, non-tender. Bowel sounds normal. No masses,  Extremities:  able to move all extremities normal.  Left hand was dressed cleanly  Skin: Normal.  Neurologic: AOx3. Cranial nerves 2-12 and sensation grossly intact. Psychiatric: non focal    Recent Results (from the past 24 hour(s))   CBC WITH AUTOMATED DIFF    Collection Time: 02/10/22 10:20 PM   Result Value Ref Range    WBC 10.7 4.1 - 11.1 K/uL    RBC 4.35 4.10 - 5.70 M/uL    HGB 14.3 12.1 - 17.0 g/dL    HCT 41.0 36.6 - 50.3 %    MCV 94.3 80.0 - 99.0 FL    MCH 32.9 26.0 - 34.0 PG    MCHC 34.9 30.0 - 36.5 g/dL    RDW 13.1 11.5 - 14.5 %    PLATELET 184 250 - 939 K/uL    MPV 10.8 8.9 - 12.9 FL    NEUTROPHILS 62 32 - 75 %    LYMPHOCYTES 23 12 - 49 %    MONOCYTES 12 5 - 13 %    EOSINOPHILS 2 0 - 7 %    BASOPHILS 0 0 - 1 %    IMMATURE GRANULOCYTES 1 (H) 0.0 - 0.5 %    ABS. NEUTROPHILS 6.7 1.8 - 8.0 K/UL    ABS. LYMPHOCYTES 2.4 0.8 - 3.5 K/UL    ABS. MONOCYTES 1.3 (H) 0.0 - 1.0 K/UL    ABS. EOSINOPHILS 0.2 0.0 - 0.4 K/UL    ABS. BASOPHILS 0.0 0.0 - 0.1 K/UL    ABS. IMM.  GRANS. 0.1 (H) 0.00 - 0.04 K/UL    DF AUTOMATED     METABOLIC PANEL, COMPREHENSIVE    Collection Time: 02/10/22 10:20 PM   Result Value Ref Range    Sodium 135 (L) 136 - 145 mmol/L    Potassium 4.0 3.5 - 5.1 mmol/L    Chloride 101 97 - 108 mmol/L    CO2 27 21 - 32 mmol/L    Anion gap 7 5 - 15 mmol/L    Glucose 92 65 - 100 mg/dL    BUN 7 6 - 20 mg/dL    Creatinine 0.74 0.70 - 1.30 mg/dL    BUN/Creatinine ratio 9 (L) 12 - 20      GFR est AA >60 >60 ml/min/1.73m2    GFR est non-AA >60 >60 ml/min/1.73m2    Calcium 9.1 8.5 - 10.1 mg/dL    Bilirubin, total 0.2 0.2 - 1.0 mg/dL    AST (SGOT) 10 (L) 15 - 37 U/L    ALT (SGPT) 15 12 - 78 U/L    Alk. phosphatase 82 45 - 117 U/L    Protein, total 7.8 6.4 - 8.2 g/dL    Albumin 3.3 (L) 3.5 - 5.0 g/dL    Globulin 4.5 (H) 2.0 - 4.0 g/dL    A-G Ratio 0.7 (L) 1.1 - 2.2     COVID-19 WITH INFLUENZA A/B    Collection Time: 02/11/22  2:44 AM   Result Value Ref Range    SARS-CoV-2 Not Detected Not Detected      Influenza A by PCR Not Detected Not Detected      Influenza B by PCR Not Detected Not Detected         XR Results (maximum last 3): Results from Hospital Encounter encounter on 02/10/22    XR HAND LT MIN 3 V    Narrative  History: Abscess. Three views of the left hand show normal bone mineralization. There is no  fracture. Joint spaces have a normal appearance. No radiopaque foreign body is  seen. Dorsal soft tissue swelling. No deep soft tissue gas bubble. Impression  No acute bony abnormality. No radiopaque foreign body. Dorsal soft  tissue swelling. Results from Hospital Encounter encounter on 02/02/22    XR HAND LT MIN 3 V    Narrative  PROCEDURE: XR HAND LT MIN 3 V    HISTORY:Swelling. Injury? COMPARISON:None      TECHNIQUE: 3 views left hand    BONES: Within normal limits. JOINTS: Within normal limits    SOFT TISSUES: There appears to be diffuse soft tissue swelling around the hand. No foreign body or soft tissue emphysema appreciated. OTHER: None    Impression  No specific acute or active process.       Results from [place] encounter on 01/13/21    XR CHEST PORT    Narrative  Study: XR CHEST PORT    Clinical indication: sob    Comparison: Acute abdominal series 9/6/2015. Impression  Findings/impression:    No consolidative airspace disease, pleural effusion or pneumothorax. Cardiomediastinal contours are within normal limits. No pulmonary edema. No acute osseous abnormality identified. CT Results (maximum last 3): No results found for this or any previous visit. MRI Results (maximum last 3): No results found for this or any previous visit. Nuclear Medicine Results (maximum last 3): No results found for this or any previous visit. US Results (maximum last 3): No results found for this or any previous visit. Assessment:   #Right hand abscess      Plan:     #Right hand abscess  -Broad-spectrum antibiotics: Vancomycin and cefepime  -Consult ID  -Wound care.         Signed By: Elisa Galaviz MD     February 11, 2022

## 2022-02-11 NOTE — PROGRESS NOTES
Problem: Falls - Risk of  Goal: *Absence of Falls  Description: Document Gail Mago Fall Risk and appropriate interventions in the flowsheet.   Outcome: Progressing Towards Goal  Note: Fall Risk Interventions:            Medication Interventions: Patient to call before getting OOB         History of Falls Interventions: Vital signs minimum Q4HRs X 24 hrs (comment for end date)

## 2022-02-11 NOTE — CONSULTS
Consult Date: 2/11/2022    Consults  Hand abscess    Subjective  This is a 37year old male who initially presented about a week ago to the ED complaining of left hand pain and swelling of acute onset. Exam described a lesion on his left second finger with fluctuance and erythema. Plain x-rays were unremarkable. He was discharged on Clindamycin. He returned yesterday because of persistent pain and worsening wound, now draining purulent material and with erythema left forearm. He was again afebrile and WBC was normal.   Repeat x-rays showed only dorsal soft tissue swelling. Images reviewed by me. Blood and wound cultures were sent and he was started on Cefepime and Vancomycin. ID has been consulted for this reason. Patient affirms history above and only noted a small wound on his left index finger initially but after crawling under a house he noticed swelling on the top of his left hand with redness and drainage. No fever or chils. History reviewed. No pertinent past medical history. History reviewed. No pertinent surgical history. History reviewed. No pertinent family history.    Social History     Tobacco Use    Smoking status: Current Every Day Smoker    Smokeless tobacco: Never Used   Substance Use Topics    Alcohol use: Yes       Current Facility-Administered Medications   Medication Dose Route Frequency Provider Last Rate Last Admin    sodium chloride (NS) flush 5-40 mL  5-40 mL IntraVENous Q8H Madhuri Irvin MD   10 mL at 02/11/22 8487    sodium chloride (NS) flush 5-40 mL  5-40 mL IntraVENous PRN Mackenzie Allen MD        polyethylene glycol (MIRALAX) packet 17 g  17 g Oral DAILY PRN Mackenzie Allen MD        ondansetron (ZOFRAN ODT) tablet 4 mg  4 mg Oral Q8H PRN Mackenzie Allen MD        Or    ondansetron (ZOFRAN) injection 4 mg  4 mg IntraVENous Q6H PRN Mackenzie Allen MD        enoxaparin (LOVENOX) injection 40 mg  40 mg SubCUTAneous DAILY Madhuri Irvin MD        cefepime (MAXIPIME) 1 g in sterile water (preservative free) 10 mL IV syringe  1 g IntraVENous Q12H Laura Narayanan MD   1 g at 02/11/22 6922    oxyCODONE-acetaminophen (PERCOCET) 5-325 mg per tablet 1 Tablet  1 Tablet Oral Q6H PRN Laura Narayanan MD   1 Tablet at 02/11/22 0614    vancomycin (VANCOCIN) 1,750 mg in 0.9% sodium chloride 500 mL IVPB  1,750 mg IntraVENous Q12H Samy Irvin MD        VANCOMYCIN INFORMATION NOTE 1 Each  1 Each Other Rx Dosing/Monitoring Laura Narayanan MD        [START ON 2/12/2022] Draw a Vancomycin random level on SAT 2/12 @ 0400   Other [facility] Partners[facility] MD            Review of Systems    Objective     Vital signs for last 24 hours:  Visit Vitals  /76 (BP Patient Position: At rest)   Pulse 79   Temp 97.6 °F (36.4 °C)   Resp 18   Ht 6' 1\" (1.854 m)   Wt 191 lb (86.6 kg)   SpO2 99%   BMI 25.20 kg/m²       Intake/Output this shift:  Current Shift: No intake/output data recorded. Last 3 Shifts: No intake/output data recorded. Data Review:   Recent Results (from the past 24 hour(s))   CBC WITH AUTOMATED DIFF    Collection Time: 02/10/22 10:20 PM   Result Value Ref Range    WBC 10.7 4.1 - 11.1 K/uL    RBC 4.35 4.10 - 5.70 M/uL    HGB 14.3 12.1 - 17.0 g/dL    HCT 41.0 36.6 - 50.3 %    MCV 94.3 80.0 - 99.0 FL    MCH 32.9 26.0 - 34.0 PG    MCHC 34.9 30.0 - 36.5 g/dL    RDW 13.1 11.5 - 14.5 %    PLATELET 122 970 - 336 K/uL    MPV 10.8 8.9 - 12.9 FL    NEUTROPHILS 62 32 - 75 %    LYMPHOCYTES 23 12 - 49 %    MONOCYTES 12 5 - 13 %    EOSINOPHILS 2 0 - 7 %    BASOPHILS 0 0 - 1 %    IMMATURE GRANULOCYTES 1 (H) 0.0 - 0.5 %    ABS. NEUTROPHILS 6.7 1.8 - 8.0 K/UL    ABS. LYMPHOCYTES 2.4 0.8 - 3.5 K/UL    ABS. MONOCYTES 1.3 (H) 0.0 - 1.0 K/UL    ABS. EOSINOPHILS 0.2 0.0 - 0.4 K/UL    ABS. BASOPHILS 0.0 0.0 - 0.1 K/UL    ABS. IMM.  GRANS. 0.1 (H) 0.00 - 0.04 K/UL    DF AUTOMATED     METABOLIC PANEL, COMPREHENSIVE    Collection Time: 02/10/22 10:20 PM   Result Value Ref Range    Sodium 135 (L) 136 - 145 mmol/L    Potassium 4.0 3.5 - 5.1 mmol/L    Chloride 101 97 - 108 mmol/L    CO2 27 21 - 32 mmol/L    Anion gap 7 5 - 15 mmol/L    Glucose 92 65 - 100 mg/dL    BUN 7 6 - 20 mg/dL    Creatinine 0.74 0.70 - 1.30 mg/dL    BUN/Creatinine ratio 9 (L) 12 - 20      GFR est AA >60 >60 ml/min/1.73m2    GFR est non-AA >60 >60 ml/min/1.73m2    Calcium 9.1 8.5 - 10.1 mg/dL    Bilirubin, total 0.2 0.2 - 1.0 mg/dL    AST (SGOT) 10 (L) 15 - 37 U/L    ALT (SGPT) 15 12 - 78 U/L    Alk. phosphatase 82 45 - 117 U/L    Protein, total 7.8 6.4 - 8.2 g/dL    Albumin 3.3 (L) 3.5 - 5.0 g/dL    Globulin 4.5 (H) 2.0 - 4.0 g/dL    A-G Ratio 0.7 (L) 1.1 - 2.2     COVID-19 WITH INFLUENZA A/B    Collection Time: 02/11/22  2:44 AM   Result Value Ref Range    SARS-CoV-2 Not Detected Not Detected      Influenza A by PCR Not Detected Not Detected      Influenza B by PCR Not Detected Not Detected     MRSA SCREEN - PCR (NASAL)    Collection Time: 02/11/22  6:00 AM   Result Value Ref Range    MRSA by PCR, Nasal Not Detected Not Detected       XR left hand (2/10)        Physical Exam  Vitals and nursing note reviewed. Constitutional:       Appearance: He is not ill-appearing. HENT:      Head: Normocephalic and atraumatic. Right Ear: External ear normal.      Left Ear: External ear normal.      Nose: Nose normal.      Mouth/Throat:      Pharynx: Oropharynx is clear. Eyes:      Pupils: Pupils are equal, round, and reactive to light. Cardiovascular:      Rate and Rhythm: Normal rate and regular rhythm. Heart sounds: No murmur heard. Pulmonary:      Effort: Pulmonary effort is normal.      Breath sounds: Normal breath sounds. Abdominal:      General: Bowel sounds are normal.      Palpations: Abdomen is soft. Tenderness: There is no abdominal tenderness. Genitourinary:     Comments: No Robertson  Musculoskeletal:         General: Swelling present. Cervical back: Neck supple. Right lower leg: No edema.       Left lower leg: No edema. Comments: Marked swelling dorsum of left hand with small open wound with drainage and erythema, tenderness    Healed lesion left index finger   Skin:     Findings: Erythema and lesion present. No rash. Neurological:      General: No focal deficit present. Mental Status: He is alert and oriented to person, place, and time. Psychiatric:         Mood and Affect: Mood normal.         Behavior: Behavior normal.         Thought Content: Thought content normal.         Judgment: Judgment normal.         ASSESSMENT/PLAN    1. Cellulitis and possible abscess, dorsum left hand, etiology unclear  2. Elevated procal and CRP, secondary to #1  3. Uncontrolled diabetes mellitus  4. Penicillin allergy    Comment: Would be very concerned for MSSA/MRSA, however, his exposure to soil while in the crawlspace under the house raises concern Gram negative rods and soil anaerobes. For that reason, I would favor switching to Meropenem, since Cefepime has no anaerobic activity. 1. Continue IV Vancomycin  2. Discontinue Cefepime  3. Start IV Meropenem  4. Culture taken from drainage left hand  5. Follow-up blood and wound cultures that are pending  6. In the am, repeat procal and CRP  7. CT scan left hand to rule out abscess  8. Orthopedics consult  9. Send for medical records from PCP    [facility] MAYRA Machado MD

## 2022-02-11 NOTE — WOUND CARE
IP WOUND CONSULT    [address] [facility] RECORD NUMBER:  [id number]  AGE: 37 y.o. GENDER: male  : 1980  TODAY'S DATE:  2022    GENERAL     [] Follow-up   [x] Marlene Marrufo is a 37 y.o. male referred by:   [x] Physician  [] Nursing  [] Other:         PAST MEDICAL HISTORY    History reviewed. No pertinent past medical history. PAST SURGICAL HISTORY    History reviewed. No pertinent surgical history. FAMILY HISTORY    History reviewed. No pertinent family history. ALLERGIES    Allergies   Allergen Reactions    Pcn [Penicillins] Anaphylaxis       MEDICATIONS    No current facility-administered medications on file prior to encounter. Current Outpatient Medications on File Prior to Encounter   Medication Sig Dispense Refill    buPROPion XL (WELLBUTRIN XL) 150 mg tablet Take 1 Tab by mouth daily. Indications: Anxiousness associated with Depression (Patient not taking: Reported on 2/10/2022) 30 Tab 0         [unfilled]  Visit Vitals  /70 (BP Patient Position: At rest)   Pulse 73   Temp 98.5 °F (36.9 °C)   Resp 18   Ht 6' 1\" (1.854 m)   Wt 86.6 kg (191 lb)   SpO2 97%   BMI 25.20 kg/m²       ASSESSMENT     Wound Identification & Type: Abscess / cellulitis of left posterior hand  Dressing change: Yes, see flow chart  Verbal consent for picture: Yes    Contributing Factors: anticoagulation therapy, edema and smoking    Wound Hand Left;Dorsal (Active)   Wound Image   22 1609   Wound Etiology Other (Comment) 22 1609   Dressing Status New drainage noted;New dressing applied 22 1609   Cleansed Cleansed with saline 22 1609   Dressing/Treatment Foam;Roll gauze;Tape/Soft cloth adhesive tape 22 1609   Dressing Change Due 22 1609   Drainage Amount Scant 22 1609   Drainage Description Serosanguinous 22 1609   Wound Odor None 22 1609   Larissa-Wound/Incision Assessment Edematous; Warm 22 1609   Edges Undefined edges 02/11/22 1609   Number of days: 1          PLAN     Skin Care & Pressure Relief Recommendations  Minimize layers of linen  Turn/reposition approximately every 2 hours    Johnie Pope  Blood Glucose: 92 on 2/10/22                              Albumin: 3.3 on 2/10/22  WBCs: 10.7 on 2/10/22    Support Surface: Gel mattress    Physician/Provider notified:   Recommendations: Per patient, injury likely occurred when working beneath a house. Denies having recent insect bite. Edema noted with erythema. When gently pressed, pink tinged serous fluid drains. You can see the fluid beneath the tissue and center is slightly fluctuant. Dr. Leno Wilkinson also present for examination. Apply a warm compress TID for approximately 30 minutes, keep dry sterile gauze over skin. After compress, gently wash with soap and water and dress with absorbent non-adherent foam, see dressing order. Keep left hand elevated. Patient ambulatory and independent in ADLs. No other wound care issues at this time. Orthopedics also consulted. Will continue to follow.         Discharge Wound Care Needs: TBD  Teaching completed with:   [] Patient           [] Family member       [] Caregiver          [] Nursing  [] Other    Patient/Caregiver Teaching:  Level of patient/caregiver understanding able to:   [] Indicates understanding       [] Needs reinforcement  [] Unsuccessful      [] Verbal Understanding  [] Demonstrated understanding       [] No evidence of learning  [] Refused teaching         [] N/A       Electronically signed by Jaquan Muhammad RN on 2/11/2022 at 4:16 PM

## 2022-02-11 NOTE — ED PROVIDER NOTES
EMERGENCY DEPARTMENT HISTORY AND PHYSICAL EXAM      Date: 2/10/2022  Patient Name: Mendoza Erwin      History of Presenting Illness     Chief Complaint   Patient presents with    Hand Pain     Left Hand       History Provided By: Patient    HPI: Mendoza Erwin, 37 y.o. male with a past medical history significant No significant past medical history presents to the ED with cc of left hand pain. Patient was seen about 1 week ago. He was diagnosed with an abscess and started on clindamycin. The area has become progressively more red and swollen. Is spontaneously draining pus now. He is also had streaking up his arm. Denies any systemic symptoms. Not clinically septic. Denies IVDA. States that this all started after he had been crawling under a house. There are no other complaints, changes, or physical findings at this time. PCP: None    Current Outpatient Medications   Medication Sig Dispense Refill    buPROPion XL (WELLBUTRIN XL) 150 mg tablet Take 1 Tab by mouth daily. Indications: Anxiousness associated with Depression (Patient not taking: Reported on 2/10/2022) 30 Tab 0       Past History     Past Medical History:  History reviewed. No pertinent past medical history. Past Surgical History:  History reviewed. No pertinent surgical history. Family History:  History reviewed. No pertinent family history. Social History:  Social History     Tobacco Use    Smoking status: Current Every Day Smoker    Smokeless tobacco: Never Used   Substance Use Topics    Alcohol use: Yes    Drug use: Yes     Types: Marijuana       Allergies: Allergies   Allergen Reactions    Pcn [Penicillins] Anaphylaxis         Review of Systems     Review of Systems   Constitutional: Negative for activity change, appetite change and fever. HENT: Negative for rhinorrhea and sore throat. Eyes: Negative for visual disturbance. Respiratory: Negative for cough and shortness of breath.     Cardiovascular: Negative for chest pain. Gastrointestinal: Negative for abdominal pain, diarrhea, nausea and vomiting. Genitourinary: Negative for dysuria. Musculoskeletal: Negative for arthralgias and myalgias. Skin: Positive for color change and wound. Negative for rash. Neurological: Negative for headaches. Psychiatric/Behavioral: Negative for confusion. All other systems reviewed and are negative. Physical Exam     Physical Exam  Vitals and nursing note reviewed. Constitutional:       General: He is not in acute distress. Appearance: Normal appearance. HENT:      Head: Normocephalic and atraumatic. Eyes:      Pupils: Pupils are equal, round, and reactive to light. Cardiovascular:      Rate and Rhythm: Normal rate and regular rhythm. Pulses: Normal pulses. Pulmonary:      Effort: Pulmonary effort is normal.   Musculoskeletal:         General: No deformity. Skin:     Coloration: Skin is not pale. Findings: No rash. Comments: Spontaneously draining abscess on the dorsum of the left hand. Quarter size area of induration with surrounding blanching erythema. Streaking up the arm to the axilla. Neurological:      General: No focal deficit present. Mental Status: He is alert.    Psychiatric:         Mood and Affect: Mood normal.         Behavior: Behavior normal.         Lab and Diagnostic Study Results     Labs -     Recent Results (from the past 12 hour(s))   CBC WITH AUTOMATED DIFF    Collection Time: 02/10/22 10:20 PM   Result Value Ref Range    WBC 10.7 4.1 - 11.1 K/uL    RBC 4.35 4.10 - 5.70 M/uL    HGB 14.3 12.1 - 17.0 g/dL    HCT 41.0 36.6 - 50.3 %    MCV 94.3 80.0 - 99.0 FL    MCH 32.9 26.0 - 34.0 PG    MCHC 34.9 30.0 - 36.5 g/dL    RDW 13.1 11.5 - 14.5 %    PLATELET 164 568 - 032 K/uL    MPV 10.8 8.9 - 12.9 FL    NEUTROPHILS 62 32 - 75 %    LYMPHOCYTES 23 12 - 49 %    MONOCYTES 12 5 - 13 %    EOSINOPHILS 2 0 - 7 %    BASOPHILS 0 0 - 1 %    IMMATURE GRANULOCYTES 1 (H) 0.0 - 0.5 %    ABS. NEUTROPHILS 6.7 1.8 - 8.0 K/UL    ABS. LYMPHOCYTES 2.4 0.8 - 3.5 K/UL    ABS. MONOCYTES 1.3 (H) 0.0 - 1.0 K/UL    ABS. EOSINOPHILS 0.2 0.0 - 0.4 K/UL    ABS. BASOPHILS 0.0 0.0 - 0.1 K/UL    ABS. IMM. GRANS. 0.1 (H) 0.00 - 0.04 K/UL    DF AUTOMATED     METABOLIC PANEL, COMPREHENSIVE    Collection Time: 02/10/22 10:20 PM   Result Value Ref Range    Sodium 135 (L) 136 - 145 mmol/L    Potassium 4.0 3.5 - 5.1 mmol/L    Chloride 101 97 - 108 mmol/L    CO2 27 21 - 32 mmol/L    Anion gap 7 5 - 15 mmol/L    Glucose 92 65 - 100 mg/dL    BUN 7 6 - 20 mg/dL    Creatinine 0.74 0.70 - 1.30 mg/dL    BUN/Creatinine ratio 9 (L) 12 - 20      GFR est AA >60 >60 ml/min/1.73m2    GFR est non-AA >60 >60 ml/min/1.73m2    Calcium 9.1 8.5 - 10.1 mg/dL    Bilirubin, total 0.2 0.2 - 1.0 mg/dL    AST (SGOT) 10 (L) 15 - 37 U/L    ALT (SGPT) 15 12 - 78 U/L    Alk. phosphatase 82 45 - 117 U/L    Protein, total 7.8 6.4 - 8.2 g/dL    Albumin 3.3 (L) 3.5 - 5.0 g/dL    Globulin 4.5 (H) 2.0 - 4.0 g/dL    A-G Ratio 0.7 (L) 1.1 - 2.2         Radiologic Studies -   [unfilled]  CT Results  (Last 48 hours)    None        CXR Results  (Last 48 hours)    None          Medical Decision Making and ED Course   - I am the first and primary provider for this patient AND AM THE PRIMARY PROVIDER OF RECORD. - I reviewed the vital signs, available nursing notes, past medical history, past surgical history, family history and social history. - Initial assessment performed. The patients presenting problems have been discussed, and the staff are in agreement with the care plan formulated and outlined with them. I have encouraged them to ask questions as they arise throughout their visit. Vital Signs-Reviewed the patient's vital signs.     Patient Vitals for the past 12 hrs:   Temp Pulse Resp BP SpO2   02/10/22 2022 98.3 °F (36.8 °C) 83 18 (!) 151/115 100 %         Records Reviewed: Nursing Notes      ED Course:       ED Course as of 02/11/22 4850 Fri Feb 11, 2022   268 73-year-old male presents for evaluation of abscess on the dorsum of the left hand. He was seen earlier here for the same and diagnosed with clindamycin. Unfortunately the abscess continue to worsen and he now has erythema and streaking up his arm to his axilla. The wound has spontaneously draining purulent drainage. Concerning for MRSA infection. He has full range of motion of his hand is neurovascularly intact. Getting plain film as well as labs including a CBC, CMP and blood cultures. Wound culture pending as well. Patient will require admission. Vancomycin started. Dr. Ej Benavides accepts [LW]      ED Course User Index  [LW] Ximena Agarwal MD           Consultations:       Consultations: -  Hospitalist Consultant: Dr. Ej Benavides: We have asked for emergent assistance with regard to this patient. We have discussed the patients HPI, ROS, PE and results this far. They will come and evaluate the patient for admission. Disposition     Disposition: Admitted to Floor Medical Floor the case was discussed with the admitting physician Ej Benavides. Admitted      Diagnosis     Clinical Impression:   1. Abscess of hand        Attestations:    Bita Sexton MD    Please note that this dictation was completed with [employer], the computer voice recognition software. Quite often unanticipated grammatical, syntax, homophones, and other interpretive errors are inadvertently transcribed by the computer software. Please disregard these errors. Please excuse any errors that have escaped final proofreading. Thank you.

## 2022-02-11 NOTE — PROGRESS NOTES
Follow up from earlier admission by Dr. Amelie Anglin -- see Jessiehakan Lee    Patient with progressive left hand (dorsum) swelling/redness and drainage despite 1 week of oral antibiotic.        VS    Patient is right handed    Left hand: dorsum with erythematous change and ltd fluctuant area; able to  hand; good capillary refill in fingers; no sensory deficit    Cont IV Abx as ordered  ID consulted

## 2022-02-11 NOTE — CONSULTS
ORTHOPEDIC CONSULT    Patient: Gaby Garcia MRN: [id number]  SSN: [id number]    YOB: 1980  Age: 37 y.o. Sex: male      Subjective:      Gaby Garcia is a 37 y.o. male who is being seen in orthopedic consultation for evaluation of left hand abscess. The patient states approximate 7 days ago he injured his left hand while working underneath a house. He is unsure if he had a insect bite or not. He states since that time he has had increased pain swelling redness of his left hand. The patient states he had been on oral antibiotics which offered no relief of his symptoms. He is now complaining of moderate pain of his left hand. He denies any fevers or chills at home. He is right-hand dominant. He denies history of diabetes. He states he has never had this incident happen before. He denies any other musculoskeletal complaints at this time. History reviewed. No pertinent past medical history. History reviewed. No pertinent surgical history. History reviewed. No pertinent family history. Social History     Tobacco Use    Smoking status: Current Every Day Smoker    Smokeless tobacco: Never Used   Substance Use Topics    Alcohol use: Yes      Prior to Admission medications    Medication Sig Start Date End Date Taking? Authorizing Provider   buPROPion XL (WELLBUTRIN XL) 150 mg tablet Take 1 Tab by mouth daily. Indications: Anxiousness associated with Depression  Patient not taking: Reported on 2/10/2022 12/16/19   Stacy Klein MD       Allergies   Allergen Reactions    Pcn [Penicillins] Anaphylaxis       Review of Systems:  Review of Systems   Constitutional: Negative. HENT: Negative. Eyes: Negative. Respiratory: Negative. Cardiovascular: Negative. Gastrointestinal: Negative. Genitourinary: Negative. Musculoskeletal: Positive for joint pain. Left hand   Skin: Positive for rash. Left hand   Neurological: Negative. Endo/Heme/Allergies: Negative. Psychiatric/Behavioral: Negative. Objective:     Current Facility-Administered Medications   Medication Dose Route Frequency    sodium chloride (NS) flush 5-40 mL  5-40 mL IntraVENous Q8H    sodium chloride (NS) flush 5-40 mL  5-40 mL IntraVENous PRN    polyethylene glycol (MIRALAX) packet 17 g  17 g Oral DAILY PRN    ondansetron (ZOFRAN ODT) tablet 4 mg  4 mg Oral Q8H PRN    Or    ondansetron (ZOFRAN) injection 4 mg  4 mg IntraVENous Q6H PRN    enoxaparin (LOVENOX) injection 40 mg  40 mg SubCUTAneous DAILY    oxyCODONE-acetaminophen (PERCOCET) 5-325 mg per tablet 1 Tablet  1 Tablet Oral Q6H PRN    vancomycin (VANCOCIN) 1,750 mg in 0.9% sodium chloride 500 mL IVPB  1,750 mg IntraVENous Q12H    VANCOMYCIN INFORMATION NOTE 1 Each  1 Each Other Rx Dosing/Monitoring    [START ON 2/12/2022] Draw a Vancomycin random level on SAT 2/12 @ 0400   Other ONCE    meropenem (MERREM) 1 g in 0.9% sodium chloride 20 mL IV syringe  1 g IntraVENous Q8H      Vitals:    02/11/22 0213 02/11/22 0524 02/11/22 0743 02/11/22 1524   BP: 123/65 119/76 132/76 122/70   Pulse: 65 71 79 73   Resp: 16 18 18 18   Temp:  98.1 °F (36.7 °C) 97.6 °F (36.4 °C) 98.5 °F (36.9 °C)   SpO2: 98% 97% 99% 97%   Weight:  86.6 kg (191 lb)     Height:  6' 1\" (1.854 m)          Alert and oriented x3, No apparent distress    Physical Exam:  Left hand: There is moderate swelling seen on the dorsal aspect of his left hand. Midportion of the metacarpal region there is a fluctuant mass with purulent drainage expressed to palpation. There was erythema seen around the site. No induration. No necrotic tissue seen. There was some scaling of the epidermis seen. There is mild swelling seen throughout the fingers of the left hand. There was limited range of motion to the fingers of left hand secondary to pain and swelling. Cap refill is 2 seconds. Radial pulses palpable. Marcus's test within normal limits.   Full range of motion left wrist elbow and shoulder without discomfort. Left upper extremity neurovascularly intact. Labs:  CBC:  Recent Labs     02/10/22  2220   WBC 10.7   RBC 4.35   HGB 14.3   HCT 41.0   MCV 94.3   RDW 13.1        CHEMISTRIES:  Recent Labs     02/10/22  2220   *   K 4.0      CO2 27   BUN 7   CREA 0.74   CA 9.1   PT/INR:No results for input(s): INR, INREXT in the last 72 hours. No lab exists for component: PROTIME  APTT:No results for input(s): APTT in the last 72 hours. LIVER PROFILE:  Recent Labs     02/10/22  2220   AST 10*   ALT 15       IMAGING:  X-rays taken of his left hand show no acute fractures. No foreign body seen. No periosteal reaction seen. CT scan left hand pending  Assessment/Plan:     Hospital Problems  Never Reviewed          Codes Class Noted POA    Abscess of hand ICD-10-CM: L02.519  ICD-9-CM: 682.4  2/11/2022 Unknown          Abscess left hand  This patient will require surgical incision debridement of his abscess. The procedure, risk and benefits were explained to the patient detail including but limited to: Risk of sepsis, possible need to repeat surgery, leaving wound open to heal secondarily, neurovascular injury, decreased use of extremity, risk of DVT, risk of pneumonia, possible reaction to anesthesia and possible death. Patient expressed full understanding agrees to proceed with surgical intervention. Wound cultures have been obtained and are pending. Plan for the OR tomorrow with Dr. Ethan Stone. This patient was examined direct consult with Dr. Ethan Stone. Thank you for the courtesy of this consult.     Signed By: Dalia Rodriguez PA-C     February 11, 2022

## 2022-02-11 NOTE — PROGRESS NOTES
Reason for Admission:  Abscess of Hand                     RUR Score:     9% Low                Plan for utilizing home health:      No prior [facility]    PCP: First and Last name:  Dr. Carol Melendez        Name of Practice:  [address] [place] [address]    Are you a current patient: Yes/No:    Approximate date of last visit:    Can you participate in a virtual visit with your PCP:                     Current Advanced Directive/Advance Care Plan: Full Code      Healthcare Decision Maker:     Spouse Lizet Stallworth @ [phone] primary decision maker       Click here to complete [address] including selection of the Healthcare Decision Maker Relationship (ie \"Primary\")                             Transition of Care Plan:                      Lives in a one level home w/spouse. New patient appointment scheduled on Feb 18th, 2022  to establish care at PCP (Dr. Carol Melendez) @ [place] [address], [address] [address].  [phone]. [facility] ([address]). Independent w/all ADL's & IADL's and drives to all medical appointment's. No assistance is needed at this given time. No home O2 or DME. No prior HH, SNF, and or IRF in the past.      Spouse to transport at time of discharge. Care Management Interventions  PCP Verified by CM: No  Mode of Transport at Discharge:  Other (see comment) (Family)  Transition of Care Consult (CM Consult): Discharge Planning  Discharge Durable Medical Equipment: No (No home O2 or DME)  Support Systems: Spouse/Significant Other  Confirm Follow Up Transport: Family  Discharge Location  Patient Expects to be Discharged to[de-identified] [address] [place], [place] [id number]

## 2022-02-11 NOTE — ED TRIAGE NOTES
Pt reports he was seen approx 1 week ago for Left hand pain from an abscess. Pt was prescribed antibiotics to take at home. Wound has got much worse.

## 2022-02-11 NOTE — PROGRESS NOTES
Consult for Vancomycin Dosing by Pharmacy by Dr. Ewa Adams provided for this 37y.o. year old male , for indication of SSTI/Left hand abscess    Day of Therapy: 2    Goal of Level(s): (AUC = 400-600) or (trough = 10-15mcg/dL)     Other Current Antibiotics: Cefepime    Significant Cultures: All Micro Results       Procedure Component Value Units Date/Time    MRSA SCREEN - PCR (NASAL) [[id number]] Collected: 02/11/22 0600    Order Status: Completed Specimen: Swab Updated: 02/11/22 0606    COVID-19 WITH INFLUENZA A/B [[id number]] Collected: 02/11/22 0244    Order Status: Completed Specimen: Nasopharynx Updated: 02/11/22 0439     SARS-CoV-2 Not Detected        Comment: Not Detected results do not preclude SARS-CoV-2 infection and should not be used as the sole basis for patient management decisions. Results must be combined with clinical observations, patient history, and epidemiological information        Influenza A by PCR Not Detected        Influenza B by PCR Not Detected        Comment: Testing was performed using mike Addie SARS-CoV-2 and Influenza A/B nucleic acid assay. This test is a multiplex Real-Time Reverse Transcriptase Polymerase Chain Reaction (RT-PCR) based in vitro diagnostic test intended for the qualitative detection of nucleic acids from SARS-CoV-2, Influenza A, and Influenza B in nasopharyngeal and nasal swab specimens for use under the FDA's Emergency Use Authorization (EUA) only.    Fact sheet for Patients: FindDrives.pl Fact sheet   for Healthcare Providers: FindDrives.pl         COVID-19 RAPID TEST [[id number]] Collected: 02/11/22 0244    Order Status: Canceled Specimen: Nasopharyngeal     CULTURE, Conda Nailer STAIN [[id number]] Collected: 02/10/22 2130    Order Status: Completed Specimen: Wound from Hand Updated: 02/11/22 0025    CULTURE, BLOOD, PAIRED [[id number]] Collected: 02/10/22 2200    Order Status: Completed Specimen: Blood Updated: 02/10/22 2224            Serum Creatinine Creatinine   Date/Time Value Ref Range Status   02/10/2022 10:20 PM 0.74 0.70 - 1.30 mg/dL Final   01/13/2021 09:30 AM 0.66 (L) 0.70 - 1.30 mg/dL Final   12/13/2019 10:22 PM 0.89 0.70 - 1.30 MG/DL Final      Creatinine Clearance Estimated Creatinine Clearance: 150 mL/min (based on SCr of 0.74 mg/dL). BUN Lab Results   Component Value Date/Time    BUN 7 02/10/2022 10:20 PM      WBC Lab Results   Component Value Date/Time    WBC 10.7 02/10/2022 10:20 PM      Temp Temp Readings from Last 1 Encounters:   02/11/22 98.1 °F (36.7 °C)      C-Reactive Protein No results found for: CRP   Procalcitonin No results found for: PCT     Last Level: No results found for: VANCT No results found for: VANCR    Ht Readings from Last 1 Encounters:   02/11/22 185.4 cm (73\")        Wt Readings from Last 1 Encounters:   02/11/22 86.6 kg (191 lb)     Ideal body weight: 79.9 kg (176 lb 2.4 oz)  Adjusted ideal body weight: 82.6 kg (182 lb 1.4 oz)       New Regimen:   PT received  an initial dose of Vancomycin 1000 mg iv x 1 last night. Follow up today with a maintenance dose of 1750 mg iv q 12 hrs for a predicted AUC/HERB of 516. A level has been ordered for tomorrow (2/12) with AM labs    Pharmacy to follow daily and will make changes to dose and/or frequency based on clinical status.      _________________________________     Pharmacist [address] [place] [address], PHARMD

## 2022-02-12 ENCOUNTER — ANESTHESIA EVENT (OUTPATIENT)
Dept: SURGERY | Age: 42
DRG: 951 | End: 2022-02-12
Payer: MEDICAID

## 2022-02-12 ENCOUNTER — APPOINTMENT (OUTPATIENT)
Dept: CT IMAGING | Age: 42
DRG: 951 | End: 2022-02-12
Attending: INTERNAL MEDICINE
Payer: MEDICAID

## 2022-02-12 ENCOUNTER — ANESTHESIA (OUTPATIENT)
Dept: SURGERY | Age: 42
DRG: 951 | End: 2022-02-12
Payer: MEDICAID

## 2022-02-12 LAB
ANION GAP SERPL CALC-SCNC: 4 MMOL/L (ref 5–15)
BASOPHILS # BLD: 0 K/UL (ref 0–0.1)
BASOPHILS NFR BLD: 1 % (ref 0–1)
BUN SERPL-MCNC: 15 MG/DL (ref 6–20)
BUN/CREAT SERPL: 19 (ref 12–20)
CA-I BLD-MCNC: 9.1 MG/DL (ref 8.5–10.1)
CHLORIDE SERPL-SCNC: 105 MMOL/L (ref 97–108)
CO2 SERPL-SCNC: 29 MMOL/L (ref 21–32)
CREAT SERPL-MCNC: 0.77 MG/DL (ref 0.7–1.3)
CRP SERPL-MCNC: 2.5 MG/DL (ref 0–0.6)
DIFFERENTIAL METHOD BLD: ABNORMAL
EOSINOPHIL # BLD: 0.3 K/UL (ref 0–0.4)
EOSINOPHIL NFR BLD: 4 % (ref 0–7)
ERYTHROCYTE [DISTWIDTH] IN BLOOD BY AUTOMATED COUNT: 13.1 % (ref 11.5–14.5)
GLUCOSE SERPL-MCNC: 103 MG/DL (ref 65–100)
HCT VFR BLD AUTO: 39.1 % (ref 36.6–50.3)
HGB BLD-MCNC: 13.7 G/DL (ref 12.1–17)
IMM GRANULOCYTES # BLD AUTO: 0.1 K/UL (ref 0–0.04)
IMM GRANULOCYTES NFR BLD AUTO: 1 % (ref 0–0.5)
LYMPHOCYTES # BLD: 2.5 K/UL (ref 0.8–3.5)
LYMPHOCYTES NFR BLD: 35 % (ref 12–49)
MCH RBC QN AUTO: 33.2 PG (ref 26–34)
MCHC RBC AUTO-ENTMCNC: 35 G/DL (ref 30–36.5)
MCV RBC AUTO: 94.7 FL (ref 80–99)
MONOCYTES # BLD: 0.8 K/UL (ref 0–1)
MONOCYTES NFR BLD: 11 % (ref 5–13)
NEUTS SEG # BLD: 3.6 K/UL (ref 1.8–8)
NEUTS SEG NFR BLD: 48 % (ref 32–75)
NRBC # BLD: 0 K/UL (ref 0–0.01)
NRBC BLD-RTO: 0 PER 100 WBC
PLATELET # BLD AUTO: 232 K/UL (ref 150–400)
PMV BLD AUTO: 11.2 FL (ref 8.9–12.9)
POTASSIUM SERPL-SCNC: 4.2 MMOL/L (ref 3.5–5.1)
RBC # BLD AUTO: 4.13 M/UL (ref 4.1–5.7)
SODIUM SERPL-SCNC: 138 MMOL/L (ref 136–145)
VANCOMYCIN SERPL-MCNC: 27.1 UG/ML
WBC # BLD AUTO: 7.2 K/UL (ref 4.1–11.1)

## 2022-02-12 PROCEDURE — 77030019895 HC PCKNG STRP IODO -A: Performed by: ORTHOPAEDIC SURGERY

## 2022-02-12 PROCEDURE — 87205 SMEAR GRAM STAIN: CPT

## 2022-02-12 PROCEDURE — 77030000032 HC CUF TRNQT ZIMM -B: Performed by: ORTHOPAEDIC SURGERY

## 2022-02-12 PROCEDURE — 74011000250 HC RX REV CODE- 250: Performed by: INTERNAL MEDICINE

## 2022-02-12 PROCEDURE — 74011250637 HC RX REV CODE- 250/637: Performed by: INTERNAL MEDICINE

## 2022-02-12 PROCEDURE — 0LB80ZZ EXCISION OF LEFT HAND TENDON, OPEN APPROACH: ICD-10-PCS | Performed by: ORTHOPAEDIC SURGERY

## 2022-02-12 PROCEDURE — 86140 C-REACTIVE PROTEIN: CPT

## 2022-02-12 PROCEDURE — 65270000029 HC RM PRIVATE

## 2022-02-12 PROCEDURE — 73201 CT UPPER EXTREMITY W/DYE: CPT

## 2022-02-12 PROCEDURE — 74011250636 HC RX REV CODE- 250/636: Performed by: ANESTHESIOLOGY

## 2022-02-12 PROCEDURE — 2709999900 HC NON-CHARGEABLE SUPPLY: Performed by: ORTHOPAEDIC SURGERY

## 2022-02-12 PROCEDURE — 36415 COLL VENOUS BLD VENIPUNCTURE: CPT

## 2022-02-12 PROCEDURE — 80202 ASSAY OF VANCOMYCIN: CPT

## 2022-02-12 PROCEDURE — 76060000033 HC ANESTHESIA 1 TO 1.5 HR: Performed by: ORTHOPAEDIC SURGERY

## 2022-02-12 PROCEDURE — 77030040361 HC SLV COMPR DVT MDII -B: Performed by: ORTHOPAEDIC SURGERY

## 2022-02-12 PROCEDURE — 74011250636 HC RX REV CODE- 250/636: Performed by: INTERNAL MEDICINE

## 2022-02-12 PROCEDURE — 76210000016 HC OR PH I REC 1 TO 1.5 HR: Performed by: ORTHOPAEDIC SURGERY

## 2022-02-12 PROCEDURE — 85025 COMPLETE CBC W/AUTO DIFF WBC: CPT

## 2022-02-12 PROCEDURE — 80048 BASIC METABOLIC PNL TOTAL CA: CPT

## 2022-02-12 PROCEDURE — 76010000149 HC OR TIME 1 TO 1.5 HR: Performed by: ORTHOPAEDIC SURGERY

## 2022-02-12 PROCEDURE — 77030002916 HC SUT ETHLN J&J -A: Performed by: ORTHOPAEDIC SURGERY

## 2022-02-12 PROCEDURE — 74011000250 HC RX REV CODE- 250: Performed by: ORTHOPAEDIC SURGERY

## 2022-02-12 PROCEDURE — 74011000636 HC RX REV CODE- 636: Performed by: INTERNAL MEDICINE

## 2022-02-12 PROCEDURE — 77030013708 HC HNDPC SUC IRR PULS STRY –B: Performed by: ORTHOPAEDIC SURGERY

## 2022-02-12 PROCEDURE — 87147 CULTURE TYPE IMMUNOLOGIC: CPT

## 2022-02-12 RX ORDER — FENTANYL CITRATE 50 UG/ML
INJECTION, SOLUTION INTRAMUSCULAR; INTRAVENOUS AS NEEDED
Status: DISCONTINUED | OUTPATIENT
Start: 2022-02-12 | End: 2022-02-12 | Stop reason: HOSPADM

## 2022-02-12 RX ORDER — SODIUM CHLORIDE 0.9 % (FLUSH) 0.9 %
5-40 SYRINGE (ML) INJECTION AS NEEDED
Status: DISCONTINUED | OUTPATIENT
Start: 2022-02-12 | End: 2022-02-12 | Stop reason: HOSPADM

## 2022-02-12 RX ORDER — LIDOCAINE HYDROCHLORIDE 10 MG/ML
0.1 INJECTION, SOLUTION EPIDURAL; INFILTRATION; INTRACAUDAL; PERINEURAL AS NEEDED
Status: CANCELLED | OUTPATIENT
Start: 2022-02-12

## 2022-02-12 RX ORDER — FENTANYL CITRATE 50 UG/ML
50 INJECTION, SOLUTION INTRAMUSCULAR; INTRAVENOUS
Status: DISCONTINUED | OUTPATIENT
Start: 2022-02-12 | End: 2022-02-12 | Stop reason: HOSPADM

## 2022-02-12 RX ORDER — ONDANSETRON 2 MG/ML
INJECTION INTRAMUSCULAR; INTRAVENOUS AS NEEDED
Status: DISCONTINUED | OUTPATIENT
Start: 2022-02-12 | End: 2022-02-12 | Stop reason: HOSPADM

## 2022-02-12 RX ORDER — SODIUM CHLORIDE 0.9 % (FLUSH) 0.9 %
5-40 SYRINGE (ML) INJECTION AS NEEDED
Status: CANCELLED | OUTPATIENT
Start: 2022-02-12

## 2022-02-12 RX ORDER — EPHEDRINE SULFATE/0.9% NACL/PF 50 MG/5 ML
5 SYRINGE (ML) INTRAVENOUS AS NEEDED
Status: DISCONTINUED | OUTPATIENT
Start: 2022-02-12 | End: 2022-02-12 | Stop reason: HOSPADM

## 2022-02-12 RX ORDER — HYDROMORPHONE HYDROCHLORIDE 1 MG/ML
0.4 INJECTION, SOLUTION INTRAMUSCULAR; INTRAVENOUS; SUBCUTANEOUS
Status: DISCONTINUED | OUTPATIENT
Start: 2022-02-12 | End: 2022-02-12 | Stop reason: HOSPADM

## 2022-02-12 RX ORDER — LIDOCAINE HYDROCHLORIDE 10 MG/ML
INJECTION INFILTRATION; PERINEURAL AS NEEDED
Status: DISCONTINUED | OUTPATIENT
Start: 2022-02-12 | End: 2022-02-12 | Stop reason: HOSPADM

## 2022-02-12 RX ORDER — SODIUM CHLORIDE 0.9 % (FLUSH) 0.9 %
5-40 SYRINGE (ML) INJECTION EVERY 8 HOURS
Status: CANCELLED | OUTPATIENT
Start: 2022-02-12

## 2022-02-12 RX ORDER — PROPOFOL 10 MG/ML
INJECTION, EMULSION INTRAVENOUS AS NEEDED
Status: DISCONTINUED | OUTPATIENT
Start: 2022-02-12 | End: 2022-02-12 | Stop reason: HOSPADM

## 2022-02-12 RX ORDER — METOCLOPRAMIDE HYDROCHLORIDE 5 MG/ML
INJECTION INTRAMUSCULAR; INTRAVENOUS AS NEEDED
Status: DISCONTINUED | OUTPATIENT
Start: 2022-02-12 | End: 2022-02-12 | Stop reason: HOSPADM

## 2022-02-12 RX ORDER — DIPHENHYDRAMINE HYDROCHLORIDE 50 MG/ML
12.5 INJECTION, SOLUTION INTRAMUSCULAR; INTRAVENOUS AS NEEDED
Status: DISCONTINUED | OUTPATIENT
Start: 2022-02-12 | End: 2022-02-12 | Stop reason: HOSPADM

## 2022-02-12 RX ORDER — SODIUM CHLORIDE, SODIUM LACTATE, POTASSIUM CHLORIDE, CALCIUM CHLORIDE 600; 310; 30; 20 MG/100ML; MG/100ML; MG/100ML; MG/100ML
INJECTION, SOLUTION INTRAVENOUS
Status: DISCONTINUED | OUTPATIENT
Start: 2022-02-12 | End: 2022-02-12 | Stop reason: HOSPADM

## 2022-02-12 RX ORDER — SODIUM CHLORIDE 0.9 % (FLUSH) 0.9 %
5-40 SYRINGE (ML) INJECTION EVERY 8 HOURS
Status: DISCONTINUED | OUTPATIENT
Start: 2022-02-12 | End: 2022-02-12 | Stop reason: HOSPADM

## 2022-02-12 RX ADMIN — OXYCODONE AND ACETAMINOPHEN 1 TABLET: 10; 325 TABLET ORAL at 11:46

## 2022-02-12 RX ADMIN — ONDANSETRON 4 MG: 2 INJECTION INTRAMUSCULAR; INTRAVENOUS at 12:44

## 2022-02-12 RX ADMIN — SODIUM CHLORIDE, PRESERVATIVE FREE 1 G: 5 INJECTION INTRAVENOUS at 22:28

## 2022-02-12 RX ADMIN — SODIUM CHLORIDE, PRESERVATIVE FREE 1 G: 5 INJECTION INTRAVENOUS at 07:21

## 2022-02-12 RX ADMIN — VANCOMYCIN HYDROCHLORIDE 1250 MG: 1.25 INJECTION, POWDER, LYOPHILIZED, FOR SOLUTION INTRAVENOUS at 13:37

## 2022-02-12 RX ADMIN — FENTANYL CITRATE 25 MCG: 50 INJECTION, SOLUTION INTRAMUSCULAR; INTRAVENOUS at 13:38

## 2022-02-12 RX ADMIN — PROPOFOL 120 MG: 10 INJECTION, EMULSION INTRAVENOUS at 13:02

## 2022-02-12 RX ADMIN — OXYCODONE AND ACETAMINOPHEN 1 TABLET: 10; 325 TABLET ORAL at 19:16

## 2022-02-12 RX ADMIN — OXYCODONE AND ACETAMINOPHEN 1 TABLET: 10; 325 TABLET ORAL at 07:21

## 2022-02-12 RX ADMIN — SODIUM CHLORIDE, PRESERVATIVE FREE 1 G: 5 INJECTION INTRAVENOUS at 15:14

## 2022-02-12 RX ADMIN — IOPAMIDOL 100 ML: 755 INJECTION, SOLUTION INTRAVENOUS at 09:00

## 2022-02-12 RX ADMIN — PROPOFOL 30 MG: 10 INJECTION, EMULSION INTRAVENOUS at 13:25

## 2022-02-12 RX ADMIN — FENTANYL CITRATE 25 MCG: 50 INJECTION, SOLUTION INTRAMUSCULAR; INTRAVENOUS at 13:35

## 2022-02-12 RX ADMIN — SODIUM CHLORIDE, POTASSIUM CHLORIDE, SODIUM LACTATE AND CALCIUM CHLORIDE: 600; 310; 30; 20 INJECTION, SOLUTION INTRAVENOUS at 12:57

## 2022-02-12 RX ADMIN — SODIUM CHLORIDE 1.25 G: 9 INJECTION, SOLUTION INTRAVENOUS at 13:37

## 2022-02-12 RX ADMIN — SODIUM CHLORIDE, PRESERVATIVE FREE 1 G: 5 INJECTION INTRAVENOUS at 00:23

## 2022-02-12 RX ADMIN — SODIUM CHLORIDE, PRESERVATIVE FREE 10 ML: 5 INJECTION INTRAVENOUS at 04:34

## 2022-02-12 RX ADMIN — FENTANYL CITRATE 25 MCG: 50 INJECTION, SOLUTION INTRAMUSCULAR; INTRAVENOUS at 13:45

## 2022-02-12 RX ADMIN — OXYCODONE AND ACETAMINOPHEN 1 TABLET: 10; 325 TABLET ORAL at 15:14

## 2022-02-12 RX ADMIN — OXYCODONE AND ACETAMINOPHEN 1 TABLET: 10; 325 TABLET ORAL at 22:28

## 2022-02-12 RX ADMIN — VANCOMYCIN HYDROCHLORIDE 1750 MG: 1 INJECTION, POWDER, LYOPHILIZED, FOR SOLUTION INTRAVENOUS at 00:23

## 2022-02-12 RX ADMIN — SODIUM CHLORIDE, PRESERVATIVE FREE 10 ML: 5 INJECTION INTRAVENOUS at 22:32

## 2022-02-12 RX ADMIN — OXYCODONE AND ACETAMINOPHEN 1 TABLET: 10; 325 TABLET ORAL at 01:45

## 2022-02-12 RX ADMIN — METOCLOPRAMIDE HYDROCHLORIDE 10 MG: 5 INJECTION INTRAMUSCULAR; INTRAVENOUS at 12:43

## 2022-02-12 RX ADMIN — FENTANYL CITRATE 25 MCG: 50 INJECTION, SOLUTION INTRAMUSCULAR; INTRAVENOUS at 13:24

## 2022-02-12 NOTE — PROGRESS NOTES
Hospitalist Progress Note    Subjective:   Daily Progress Note: 2/12/2022 12:38 PM    Admission Hx:    Jasmin Baig is a 37 y.o. male with no significant past medical history presents to the ED with chief complaint of left hand pain. He reports sustained trauma at work (crawling on a house), injured himself and was a dirty wound. He was seen in outside ED a week ago and discharged with clindamycin. However the area has become progressively more painful and swollen, and started to spontaneously drain pus today. Also noted streaking up his arm. He otherwise denies fever or chills. \" (Dr. Mimi Syed)     In the ED, vital signs were normal, no leukocytosis. X-ray of and showed no osseous abnormalities. Vancomycin was given.   -------------------------------      Subjective: Hand bandaged. Evaluated by ortho --> I&D planned. No significant change or pain.       Current Facility-Administered Medications   Medication Dose Route Frequency    vancomycin (VANCOCIN) 1,250 mg in 0.9% sodium chloride 250 mL (VIAL-MATE)  1,250 mg IntraVENous Q12H    [START ON 2/14/2022] Vancomycin - Trough to be drawn prior to dose 2/14 @ 1300   Other ONCE    sodium chloride (NS) flush 5-40 mL  5-40 mL IntraVENous Q8H    sodium chloride (NS) flush 5-40 mL  5-40 mL IntraVENous PRN    polyethylene glycol (MIRALAX) packet 17 g  17 g Oral DAILY PRN    ondansetron (ZOFRAN ODT) tablet 4 mg  4 mg Oral Q8H PRN    Or    ondansetron (ZOFRAN) injection 4 mg  4 mg IntraVENous Q6H PRN    enoxaparin (LOVENOX) injection 40 mg  40 mg SubCUTAneous DAILY    VANCOMYCIN INFORMATION NOTE 1 Each  1 Each Other Rx Dosing/Monitoring    Draw a Vancomycin random level on SAT 2/12 @ 0400   Other ONCE    meropenem (MERREM) 1 g in 0.9% sodium chloride 20 mL IV syringe  1 g IntraVENous Q8H    oxyCODONE-acetaminophen (PERCOCET 10)  mg per tablet 1 Tablet  1 Tablet Oral Q4H PRN        Review of Systems    Constitutional: No - fever, chills    HEENT: No - sore throat, sinus pressure, ear pain    Respiratory: No - cough, sob, wheeze, hemoptysis    Cardiovascular: No - chest pain, palpitations, extremity edema    Gastrointestinal: No - n/v/d/bleeding    Genitourinary: No - dysuria, hematuria    Neurological: No - headache, focal weakness    M/S: see HPI      Objective:     Visit Vitals  /74 (BP Patient Position: At rest)   Pulse (!) 59   Temp 98 °F (36.7 °C)   Resp 18   Ht 6' 1\" (1.854 m)   Wt 86.6 kg (191 lb)   SpO2 98%   BMI 25.20 kg/m²      O2 Device: None (Room air)    Temp (24hrs), Av.1 °F (36.7 °C), Min:97.8 °F (36.6 °C), Max:98.5 °F (36.9 °C)      No intake/output data recorded. 02/10 1901 -  0700  In: 1320 [P.O.:800; I.V.:520]  Out: -     PHYSICAL EXAM    Constitutional: NAD, Awake    HEENT: No lesions, rash    Neck: Supple     Cardiovascular: S1S2, no murmur    Respiratory:  Clear breath sounds bilaterally with no wheezes, rales, or rhonchi. GI: Soft, NT; no rigidity; + bowel sounds    Musculoskeletal: Patient is right handed; Left hand: dorsum with erythematous change and ltd fluctuant area; able to  hand; good capillary refill in fingers; no sensory deficit    Neurological:  AxOx2; No new focal weakness; No tremors    Psychiatric: Mood appears appropriate     Skin: No new rash    Vascular: Palpable pulses;  No edema; good capillary refill in left hand      Data Review    Recent Results (from the past 24 hour(s))   METABOLIC PANEL, BASIC    Collection Time: 22  3:43 AM   Result Value Ref Range    Sodium 138 136 - 145 mmol/L    Potassium 4.2 3.5 - 5.1 mmol/L    Chloride 105 97 - 108 mmol/L    CO2 29 21 - 32 mmol/L    Anion gap 4 (L) 5 - 15 mmol/L    Glucose 103 (H) 65 - 100 mg/dL    BUN 15 6 - 20 mg/dL    Creatinine 0.77 0.70 - 1.30 mg/dL    BUN/Creatinine ratio 19 12 - 20      GFR est AA >60 >60 ml/min/1.73m2    GFR est non-AA >60 >60 ml/min/1.73m2    Calcium 9.1 8.5 - 10.1 mg/dL   CBC WITH AUTOMATED DIFF    Collection Time: 02/12/22  3:43 AM   Result Value Ref Range    WBC 7.2 4.1 - 11.1 K/uL    RBC 4.13 4.10 - 5.70 M/uL    HGB 13.7 12.1 - 17.0 g/dL    HCT 39.1 36.6 - 50.3 %    MCV 94.7 80.0 - 99.0 FL    MCH 33.2 26.0 - 34.0 PG    MCHC 35.0 30.0 - 36.5 g/dL    RDW 13.1 11.5 - 14.5 %    PLATELET 047 681 - 132 K/uL    MPV 11.2 8.9 - 12.9 FL    NRBC 0.0 0.0  WBC    ABSOLUTE NRBC 0.00 0.00 - 0.01 K/uL    NEUTROPHILS 48 32 - 75 %    LYMPHOCYTES 35 12 - 49 %    MONOCYTES 11 5 - 13 %    EOSINOPHILS 4 0 - 7 %    BASOPHILS 1 0 - 1 %    IMMATURE GRANULOCYTES 1 (H) 0 - 0.5 %    ABS. NEUTROPHILS 3.6 1.8 - 8.0 K/UL    ABS. LYMPHOCYTES 2.5 0.8 - 3.5 K/UL    ABS. MONOCYTES 0.8 0.0 - 1.0 K/UL    ABS. EOSINOPHILS 0.3 0.0 - 0.4 K/UL    ABS. BASOPHILS 0.0 0.0 - 0.1 K/UL    ABS. IMM. GRANS. 0.1 (H) 0.00 - 0.04 K/UL    DF AUTOMATED     VANCOMYCIN, RANDOM    Collection Time: 02/12/22  3:43 AM   Result Value Ref Range    Vancomycin, random 27.1 ug/mL   C REACTIVE PROTEIN, QT    Collection Time: 02/12/22  3:43 AM   Result Value Ref Range    C-Reactive protein 2.50 (H) 0.00 - 0.60 mg/dL       CT HAND LT W CONT   Final Result   1. Dorsal soft tissue swelling of the hand with small areas of hypodensity in   the superficial subcutaneous tissues, as detailed above, could represent   phlegmon or developing abscess. 2. Question tenosynovitis involving the flexor tendons through the carpal   tunnel. 3. No findings of acute osseous abnormality. XR HAND LT MIN 3 V   Final Result   No acute bony abnormality. No radiopaque foreign body. Dorsal soft   tissue swelling.           Active Problems:    Abscess of hand (2/11/2022)        Assessment/Plan:     #Right hand abscess  -Broad-spectrum antibiotics: Vancomycin and cefepime  -ID/Heri  -Wound care  -Ortho --> I&D      DVT Prophylaxis: On AC  Code Status: Full Code  ______________________________________________________________    Jadiel Weaver MD

## 2022-02-12 NOTE — ANESTHESIA PREPROCEDURE EVALUATION
Relevant Problems   NEUROLOGY   (+) Major depressive disorder       Anesthetic History   No history of anesthetic complications            Review of Systems / Medical History  Patient summary reviewed, nursing notes reviewed and pertinent labs reviewed    Pulmonary          Smoker         Neuro/Psych         Psychiatric history (MAJOR DEPRESSION. )     Cardiovascular  Within defined limits                Exercise tolerance: >4 METS     GI/Hepatic/Renal  Within defined limits              Endo/Other            Comments: LEFT HAND ABSCESS Other Findings   Comments: MARIJUANA TWICE WEEKLY.           Physical Exam    Airway  Mallampati: I  TM Distance: > 6 cm  Neck ROM: normal range of motion   Mouth opening: Normal     Cardiovascular    Rhythm: regular  Rate: normal         Dental    Dentition: Poor dentition     Pulmonary  Breath sounds clear to auscultation               Abdominal  GI exam deferred       Other Findings            Anesthetic Plan    ASA: 2, emergent  Anesthesia type: general          Induction: Intravenous  Anesthetic plan and risks discussed with: Patient

## 2022-02-12 NOTE — CONSULTS
ORTHOPEDIC CONSULT    Patient: Brandee Mckeon MRN: [id number]  SSN: [id number]    YOB: 1980  Age: 37 y.o. Sex: male      Subjective:      Brandee Mckeon is a 37 y.o. male presents to the ER with left hand abscess. The patient states approximate 7 days ago he injured his left hand while working underneath a house. He is unsure if he had a insect bite or not. He states since that time he has had increased pain swelling redness of his left hand. The patient states he had been on oral antibiotics which offered no relief of his symptoms. He is now complaining of moderate pain of his left hand. He denies any fevers or chills at home. He is right-hand dominant. Patient denies history of diabetes. History reviewed. No pertinent past medical history. History reviewed. No pertinent surgical history. History reviewed. No pertinent family history. Social History     Tobacco Use    Smoking status: Current Every Day Smoker    Smokeless tobacco: Never Used   Substance Use Topics    Alcohol use: Yes      Prior to Admission medications    Medication Sig Start Date End Date Taking? Authorizing Provider   buPROPion XL (WELLBUTRIN XL) 150 mg tablet Take 1 Tab by mouth daily. Indications: Anxiousness associated with Depression  Patient not taking: Reported on 2/10/2022 12/16/19   Jameson Klein MD       Allergies   Allergen Reactions    Pcn [Penicillins] Anaphylaxis       Review of Systems:  Review of Systems   Constitutional: Negative. HENT: Negative. Eyes: Negative. Respiratory: Negative. Cardiovascular: Negative. Gastrointestinal: Negative. Genitourinary: Negative. Musculoskeletal: Positive for joint pain. Left hand   Skin: Positive for rash. Left hand   Neurological: Negative. Endo/Heme/Allergies: Negative. Psychiatric/Behavioral: Negative. Left hand examination shows redness, swelling and open wound with drainage.     Objective: Current Facility-Administered Medications   Medication Dose Route Frequency    vancomycin (VANCOCIN) 1,250 mg in 0.9% sodium chloride 250 mL (VIAL-MATE)  1,250 mg IntraVENous Q12H    [START ON 2/14/2022] Vancomycin - Trough to be drawn prior to dose 2/14 @ 1300   Other ONCE    sodium chloride (NS) flush 5-40 mL  5-40 mL IntraVENous Q8H    sodium chloride (NS) flush 5-40 mL  5-40 mL IntraVENous PRN    polyethylene glycol (MIRALAX) packet 17 g  17 g Oral DAILY PRN    ondansetron (ZOFRAN ODT) tablet 4 mg  4 mg Oral Q8H PRN    Or    ondansetron (ZOFRAN) injection 4 mg  4 mg IntraVENous Q6H PRN    enoxaparin (LOVENOX) injection 40 mg  40 mg SubCUTAneous DAILY    VANCOMYCIN INFORMATION NOTE 1 Each  1 Each Other Rx Dosing/Monitoring    Draw a Vancomycin random level on SAT 2/12 @ 0400   Other ONCE    meropenem (MERREM) 1 g in 0.9% sodium chloride 20 mL IV syringe  1 g IntraVENous Q8H    oxyCODONE-acetaminophen (PERCOCET 10)  mg per tablet 1 Tablet  1 Tablet Oral Q4H PRN      Vitals:    02/11/22 1524 02/11/22 1936 02/12/22 0146 02/12/22 0717   BP: 122/70 125/77 127/70 124/74   Pulse: 73 67 60 (!) 59   Resp: 18 18 18 18   Temp: 98.5 °F (36.9 °C) 97.8 °F (36.6 °C) 97.9 °F (36.6 °C) 98 °F (36.7 °C)   SpO2: 97% 97% 98% 98%   Weight:       Height:            Alert and oriented x3, No apparent distress    Physical Exam:  Left hand: There is moderate swelling seen on the dorsal aspect of his left hand. Midportion of the metacarpal region there is a fluctuant mass with purulent drainage expressed to palpation. There was erythema seen around the site. There was some scaling of the epidermis seen. Vascularity is intact. Mild swelling of the fingers present.   Labs:  CBC:  Recent Labs     02/12/22  0343 02/10/22  2220   WBC 7.2 10.7   RBC 4.13 4.35   HGB 13.7 14.3   HCT 39.1 41.0   MCV 94.7 94.3   RDW 13.1 13.1    254     CHEMISTRIES:  Recent Labs     02/12/22  0343 02/10/22  2220    135*   K 4.2 4.0    101   CO2 29 27   BUN 15 7   CREA 0.77 0.74   CA 9.1 9.1   PT/INR:No results for input(s): INR, INREXT, INREXT in the last 72 hours. No lab exists for component: PROTIME  APTT:No results for input(s): APTT in the last 72 hours. LIVER PROFILE:  Recent Labs     02/10/22  2220   AST 10*   ALT 15       IMAGING:  X-rays taken of his left hand show no acute fractures. No foreign body seen. No periosteal reaction seen. CT scan left hand pending  Assessment/Plan:     Hospital Problems  Never Reviewed          Codes Class Noted POA    Abscess of hand ICD-10-CM: L02.519  ICD-9-CM: 682.4  2/11/2022 Unknown          Abscess left hand  This patient will require surgical incision debridement of his abscess. The procedure, risk and benefits were explained to the patient detail including but limited to: Risk of sepsis, possible need to repeat surgery, leaving wound open to heal secondarily, neurovascular injury, decreased use of extremity, risk of DVT, risk of pneumonia, possible reaction to anesthesia and possible death. Patient wants me to proceed with surgery. He'll be kept n.p.o. and surgery will be performed over next 24 hours. He received 24 hours of IV antibiotic therapy. His wound will need packing. Thank you for the courtesy of this consult.     Signed By: Marivel Valerio MD     February 12, 2022

## 2022-02-12 NOTE — OP NOTES
Operative Note    Patient: Brandee Mckeon MRN: [id number]  SSN: [id number]    YOB: 1980  Age: 37 y.o. Sex: male      Date of Surgery: 2/12/2022     Preoperative Diagnosis: Infection Left Hand    Postoperative Diagnosis:  Abscess left hand    Surgeon(s) and Role:     Janet Salas MD - Primary     Anesthesia:  General     Procedure: Procedure(s):  Irrigation And Debridement Left Hand     Indications: The patient was admitted to the hospital with left hand swelling and redness with drainage due to abscess. Discussed the risk of surgery including infection, hematoma, bleeding,  or even reaction to anesthetic medications. The patient understands the risks, any and all questions were answered to the patient's satisfaction and they freely signed the consent for operation. Procedure in Detail:    Patient was seen in the holding area and surgical site was marked. Preoperative prophylactic antibiotics were administered. DVT prophylaxis initiated. Patient brought to the main operating room. After induction of anesthesia, patient placed in a supine position left hand was prepped and draped in a sterile fashion. There was a red open wound present with some drainage. Larger incision was made. Deep dissection was done with the scissors. Diego pus was encountered. There was some necrotic and devitalized tissue present along vexed over the extensor tendons and extensor tendon sheath. All is devitalized subcutaneous tissue was debrided sharply with a knife and scissors and rongeurs. Cultures were taken. Thorough debridement was done. Wound margins were freshened. After irrigation, wound was packed and then closed with 3-0 nylon sutures and about 1 cm part of the wound was left open in the middle. Bulky dressing was applied. Anesthesia was reversed and patient was brought to recovery in stable condition.     Findings:  Left hand abscess    Estimated Blood Loss: 10 cc    Specimens: None           Drains: None                Implants: None

## 2022-02-12 NOTE — PROGRESS NOTES
Problem: Falls - Risk of  Goal: *Absence of Falls  Description: Document Cherylle Cockayne Fall Risk and appropriate interventions in the flowsheet.   Outcome: Progressing Towards Goal  Note: Fall Risk Interventions:            Medication Interventions: Patient to call before getting OOB,Teach patient to arise slowly         History of Falls Interventions: Vital signs minimum Q4HRs X 24 hrs (comment for end date)         Problem: Pain  Goal: *Control of Pain  Outcome: Progressing Towards Goal

## 2022-02-12 NOTE — ANESTHESIA POSTPROCEDURE EVALUATION
Procedure(s):  Irrigation And Debridement Left Hand.     general    Anesthesia Post Evaluation        Patient location during evaluation: PACU  Patient participation: complete - patient participated  Level of consciousness: awake  Pain score: 0  Pain management: adequate  Airway patency: patent  Anesthetic complications: no  Cardiovascular status: acceptable  Respiratory status: acceptable  Hydration status: acceptable  Post anesthesia nausea and vomiting:  controlled  Final Post Anesthesia Temperature Assessment:  Normothermia (36.0-37.5 degrees C)      INITIAL Post-op Vital signs:   Vitals Value Taken Time   /71 02/12/22 1359   Temp 36.6 °C (97.9 °F) 02/12/22 1359   Pulse 57 02/12/22 1359   Resp 17 02/12/22 1359   SpO2 100 % 02/12/22 1359

## 2022-02-12 NOTE — PROGRESS NOTES
Day #3 of Vancomycin  Consult provided for this 37 y.o. male for indication of left hand cellulitis and possible abscess.   Antibiotic regimen:  Vancomycin + meropenem  Concomitant nephrotoxic drugs: None  Frequency of BMP: Not scheduled    Recent Labs     22  0343 02/10/22  2220   WBC 7.2 10.7   CREA 0.77 0.74   BUN 15 7     Est CrCl: >100 ml/min; UO: 4X UUO  Temp (24hrs), Av.1 °F (36.7 °C), Min:97.8 °F (36.6 °C), Max:98.5 °F (36.9 °C)    Cultures:   2/10 Wound: Light Streptococci  2/10 Blood: Pending   Wound: Pending    MRSA Swab: Not detected  @ 0600    Target range: AUC/HERB 400-500    Recent level history:  Date/Time Dose & Interval Measured Level (mcg/mL) Associated AUC/HERB    @ 0343 1000 mg x 1, 1750 mg q12h 27.1 645        Assessment/Plan:   Afebrile, WBC and procal WNL, CRP trending down  SCr stable  Extrapolated AUC of 645 is supratherapeutic, will reduce dose to 1250 mg q12h for a predicted AUC of 463  Will check a level prior to dose  @ 1300  Antimicrobial stop date TBD

## 2022-02-13 LAB
BACTERIA SPEC CULT: NORMAL
GRAM STN SPEC: NORMAL
SPECIAL REQUESTS,SREQ: NORMAL
SPECIAL REQUESTS,SREQ: NORMAL

## 2022-02-13 PROCEDURE — 65270000029 HC RM PRIVATE

## 2022-02-13 PROCEDURE — 74011000250 HC RX REV CODE- 250: Performed by: INTERNAL MEDICINE

## 2022-02-13 PROCEDURE — 74011250636 HC RX REV CODE- 250/636: Performed by: INTERNAL MEDICINE

## 2022-02-13 PROCEDURE — 74011250637 HC RX REV CODE- 250/637: Performed by: INTERNAL MEDICINE

## 2022-02-13 RX ADMIN — OXYCODONE AND ACETAMINOPHEN 1 TABLET: 10; 325 TABLET ORAL at 09:32

## 2022-02-13 RX ADMIN — SODIUM CHLORIDE, PRESERVATIVE FREE 10 ML: 5 INJECTION INTRAVENOUS at 05:36

## 2022-02-13 RX ADMIN — SODIUM CHLORIDE, PRESERVATIVE FREE 1 G: 5 INJECTION INTRAVENOUS at 05:35

## 2022-02-13 RX ADMIN — VANCOMYCIN HYDROCHLORIDE 1250 MG: 1.25 INJECTION, POWDER, LYOPHILIZED, FOR SOLUTION INTRAVENOUS at 12:17

## 2022-02-13 RX ADMIN — SODIUM CHLORIDE, PRESERVATIVE FREE 10 ML: 5 INJECTION INTRAVENOUS at 21:20

## 2022-02-13 RX ADMIN — SODIUM CHLORIDE, PRESERVATIVE FREE 1 G: 5 INJECTION INTRAVENOUS at 13:17

## 2022-02-13 RX ADMIN — SODIUM CHLORIDE, PRESERVATIVE FREE 1 G: 5 INJECTION INTRAVENOUS at 21:19

## 2022-02-13 RX ADMIN — OXYCODONE AND ACETAMINOPHEN 1 TABLET: 10; 325 TABLET ORAL at 21:19

## 2022-02-13 RX ADMIN — OXYCODONE AND ACETAMINOPHEN 1 TABLET: 10; 325 TABLET ORAL at 13:17

## 2022-02-13 RX ADMIN — VANCOMYCIN HYDROCHLORIDE 1250 MG: 1.25 INJECTION, POWDER, LYOPHILIZED, FOR SOLUTION INTRAVENOUS at 00:57

## 2022-02-13 RX ADMIN — OXYCODONE AND ACETAMINOPHEN 1 TABLET: 10; 325 TABLET ORAL at 17:36

## 2022-02-13 RX ADMIN — OXYCODONE AND ACETAMINOPHEN 1 TABLET: 10; 325 TABLET ORAL at 05:36

## 2022-02-13 RX ADMIN — ENOXAPARIN SODIUM 40 MG: 100 INJECTION SUBCUTANEOUS at 09:32

## 2022-02-13 NOTE — PROGRESS NOTES
Postop day 1, status post left hand abscess I&D. Patient is doing well. He is afebrile. Pain is well controlled. Examination his hand dressing is intact. No bleeding or drainage. Fingers are slightly swollen but otherwise warm and pink. Normal sensations. Cultures are pending. Continue IV antibiotic therapy for another 24 to 48 hours. Currently, he is on vancomycin and meropenem. ID team is on board. dressing change tomorrow and remove the packing at that time. Possible discharge on Tuesday.   Oral antibiotic therapy will be tailored based on his culture and sensitivity results

## 2022-02-13 NOTE — PROGRESS NOTES
Problem: Falls - Risk of  Goal: *Absence of Falls  Description: Document [place] Fall Risk and appropriate interventions in the flowsheet.   Outcome: Progressing Towards Goal  Note: Fall Risk Interventions:            Medication Interventions: Patient to call before getting OOB         History of Falls Interventions: Vital signs minimum Q4HRs X 24 hrs (comment for end date)

## 2022-02-13 NOTE — PROGRESS NOTES
Hospitalist Progress Note    Subjective:   Daily Progress Note: 2/13/2022 12:38 PM    Admission Hx:    Master Worthington is a 37 y.o. male with no significant past medical history presents to the ED with chief complaint of left hand pain. He reports sustained trauma at work (crawling on a house), injured himself and was a dirty wound. He was seen in outside ED a week ago and discharged with clindamycin. However the area has become progressively more painful and swollen, and started to spontaneously drain pus today. Also noted streaking up his arm. He otherwise denies fever or chills. \" (Dr. Hanh Fish)     In the ED, vital signs were normal, no leukocytosis. X-ray of and showed no osseous abnormalities. Vancomycin was given.   -------------------------------      Subjective: Hand bandaged. Evaluated by ortho --> I&D done 2/12 No significant change or pain.       Current Facility-Administered Medications   Medication Dose Route Frequency    vancomycin (VANCOCIN) 1,250 mg in 0.9% sodium chloride 250 mL (VIAL-MATE)  1,250 mg IntraVENous Q12H    [START ON 2/14/2022] Vancomycin - Trough to be drawn prior to dose 2/14 @ 1300   Other ONCE    sodium chloride (NS) flush 5-40 mL  5-40 mL IntraVENous Q8H    sodium chloride (NS) flush 5-40 mL  5-40 mL IntraVENous PRN    polyethylene glycol (MIRALAX) packet 17 g  17 g Oral DAILY PRN    ondansetron (ZOFRAN ODT) tablet 4 mg  4 mg Oral Q8H PRN    Or    ondansetron (ZOFRAN) injection 4 mg  4 mg IntraVENous Q6H PRN    enoxaparin (LOVENOX) injection 40 mg  40 mg SubCUTAneous DAILY    VANCOMYCIN INFORMATION NOTE 1 Each  1 Each Other Rx Dosing/Monitoring    meropenem (MERREM) 1 g in 0.9% sodium chloride 20 mL IV syringe  1 g IntraVENous Q8H    oxyCODONE-acetaminophen (PERCOCET 10)  mg per tablet 1 Tablet  1 Tablet Oral Q4H PRN        Review of Systems    Constitutional: No - fever, chills    HEENT: No - sore throat, sinus pressure, ear pain    Respiratory: No - cough, sob, wheeze, hemoptysis    Cardiovascular: No - chest pain, palpitations, extremity edema    Gastrointestinal: No - n/v/d/bleeding    Genitourinary: No - dysuria, hematuria    Neurological: No - headache, focal weakness    M/S: see HPI      Objective:     Visit Vitals  /83 (BP Patient Position: At rest)   Pulse 65   Temp 99.2 °F (37.3 °C)   Resp 18   Ht 6' 1\" (1.854 m)   Wt 86.6 kg (191 lb)   SpO2 98%   BMI 25.20 kg/m²      O2 Device: None (Room air)    Temp (24hrs), Av.1 °F (36.7 °C), Min:97.7 °F (36.5 °C), Max:99.2 °F (37.3 °C)      No intake/output data recorded.  1901 -  0700  In: 2290 [P.O.:1000; I.V.:1290]  Out: -     PHYSICAL EXAM    Constitutional: NAD, Awake    Neck: Supple     Cardiovascular: S1S2, no murmur    Respiratory:  Clear breath sounds bilaterally with no wheezes, rales, or rhonchi. GI: Soft, NT; no rigidity; + bowel sounds    Musculoskeletal: Patient is right handed; (Initial exam: Left hand: dorsum with erythematous change and ltd fluctuant area; able to  hand; good capillary refill in fingers; no sensory deficit) - currently, bandaged; no sensory deficit, active movement in fingers    Neurological:  AxOx2; No new focal weakness; No tremors    Psychiatric: Mood appears appropriate     Vascular: Palpable pulses; No edema; good capillary refill in left hand      Data Review    No results found for this or any previous visit (from the past 24 hour(s)). CT HAND LT W CONT   Final Result   1. Dorsal soft tissue swelling of the hand with small areas of hypodensity in   the superficial subcutaneous tissues, as detailed above, could represent   phlegmon or developing abscess. 2. Question tenosynovitis involving the flexor tendons through the carpal   tunnel. 3. No findings of acute osseous abnormality. XR HAND LT MIN 3 V   Final Result   No acute bony abnormality. No radiopaque foreign body. Dorsal soft   tissue swelling.           Active Problems:    Abscess of hand (2/11/2022)        Assessment/Plan:     #Right hand abscess  -Broad-spectrum antibiotics: Vancomycin and cefepime  -ID/Heri  -Wound care  -Ortho --> I&D 2/12    DC ~ 2/15/Tuesday      DVT Prophylaxis: On AC  Code Status: Full Code  ______________________________________________________________    Jessee Beck MD

## 2022-02-14 LAB
CRP SERPL-MCNC: 2.04 MG/DL (ref 0–0.6)
DATE LAST DOSE: ABNORMAL
DATE LAST DOSE: NORMAL
REPORTED DOSE,DOSE: ABNORMAL UNITS
REPORTED DOSE,DOSE: NORMAL UNITS
VANCOMYCIN SERPL-MCNC: 10.6 UG/ML
VANCOMYCIN TROUGH SERPL-MCNC: 20 UG/ML (ref 5–10)

## 2022-02-14 PROCEDURE — 80202 ASSAY OF VANCOMYCIN: CPT

## 2022-02-14 PROCEDURE — 65270000029 HC RM PRIVATE

## 2022-02-14 PROCEDURE — 74011000250 HC RX REV CODE- 250: Performed by: INTERNAL MEDICINE

## 2022-02-14 PROCEDURE — 74011250636 HC RX REV CODE- 250/636: Performed by: INTERNAL MEDICINE

## 2022-02-14 PROCEDURE — 74011250637 HC RX REV CODE- 250/637: Performed by: INTERNAL MEDICINE

## 2022-02-14 PROCEDURE — 36415 COLL VENOUS BLD VENIPUNCTURE: CPT

## 2022-02-14 PROCEDURE — 86140 C-REACTIVE PROTEIN: CPT

## 2022-02-14 PROCEDURE — 99232 SBSQ HOSP IP/OBS MODERATE 35: CPT | Performed by: INTERNAL MEDICINE

## 2022-02-14 RX ORDER — VANCOMYCIN/0.9 % SOD CHLORIDE 1.5G/250ML
1500 PLASTIC BAG, INJECTION (ML) INTRAVENOUS EVERY 12 HOURS
Status: DISCONTINUED | OUTPATIENT
Start: 2022-02-14 | End: 2022-02-15

## 2022-02-14 RX ADMIN — OXYCODONE AND ACETAMINOPHEN 1 TABLET: 10; 325 TABLET ORAL at 01:45

## 2022-02-14 RX ADMIN — OXYCODONE AND ACETAMINOPHEN 1 TABLET: 10; 325 TABLET ORAL at 23:11

## 2022-02-14 RX ADMIN — OXYCODONE AND ACETAMINOPHEN 1 TABLET: 10; 325 TABLET ORAL at 05:53

## 2022-02-14 RX ADMIN — SODIUM CHLORIDE, PRESERVATIVE FREE 10 ML: 5 INJECTION INTRAVENOUS at 05:58

## 2022-02-14 RX ADMIN — SODIUM CHLORIDE, PRESERVATIVE FREE 1 G: 5 INJECTION INTRAVENOUS at 05:53

## 2022-02-14 RX ADMIN — Medication 1500 MG: at 23:06

## 2022-02-14 RX ADMIN — VANCOMYCIN HYDROCHLORIDE 1250 MG: 1.25 INJECTION, POWDER, LYOPHILIZED, FOR SOLUTION INTRAVENOUS at 13:12

## 2022-02-14 RX ADMIN — VANCOMYCIN HYDROCHLORIDE 1250 MG: 1.25 INJECTION, POWDER, LYOPHILIZED, FOR SOLUTION INTRAVENOUS at 01:45

## 2022-02-14 RX ADMIN — SODIUM CHLORIDE, PRESERVATIVE FREE 10 ML: 5 INJECTION INTRAVENOUS at 23:07

## 2022-02-14 RX ADMIN — OXYCODONE AND ACETAMINOPHEN 1 TABLET: 10; 325 TABLET ORAL at 19:09

## 2022-02-14 RX ADMIN — OXYCODONE AND ACETAMINOPHEN 1 TABLET: 10; 325 TABLET ORAL at 15:24

## 2022-02-14 RX ADMIN — SODIUM CHLORIDE, PRESERVATIVE FREE 1 G: 5 INJECTION INTRAVENOUS at 23:06

## 2022-02-14 RX ADMIN — ENOXAPARIN SODIUM 40 MG: 100 INJECTION SUBCUTANEOUS at 09:21

## 2022-02-14 RX ADMIN — SODIUM CHLORIDE, PRESERVATIVE FREE 1 G: 5 INJECTION INTRAVENOUS at 15:15

## 2022-02-14 NOTE — PROGRESS NOTES
Problem: Falls - Risk of  Goal: *Absence of Falls  Description: Document Ramirez Blades Fall Risk and appropriate interventions in the flowsheet.   Outcome: Progressing Towards Goal  Note: Fall Risk Interventions:            Medication Interventions: Patient to call before getting OOB,Teach patient to arise slowly         History of Falls Interventions: Vital signs minimum Q4HRs X 24 hrs (comment for end date)         Problem: Pain  Goal: *Control of Pain  Outcome: Progressing Towards Goal

## 2022-02-14 NOTE — PROGRESS NOTES
Day # 5 of Vancomycin    Consult provided for this 37 y.o. male for indication of left hand cellulitis and possible abscess. Antibiotic regimen:  Vancomycin + meropenem  Concomitant nephrotoxic drugs: None  Frequency of BMP: Not scheduled    Recent Labs     22  0343   WBC 7.2   CREA 0.77   BUN 15     Est CrCl: >100 ml/min; UO: 4X UUO  Temp (24hrs), Av.5 °F (36.9 °C), Min:98.1 °F (36.7 °C), Max:98.8 °F (37.1 °C)    Cultures:   2/10 Wound: Light Streptococci  2/10 Blood: Pending   Wound: Pending    MRSA Swab: Not detected  @ 0600    Target range: AUC/HERB 400-500    Recent level history:  Date/Time Dose & Interval Measured Level (mcg/mL) Associated AUC/HERB    @ 0343 1000 mg x 1, 1750 mg q12h 27.1 645        Assessment/Plan:   Afebrile, WBC and procal WNL, CRP trending down  SCr stable  Discontinued the 1250 mg q12hrs. Started 1500 mg q12hrs today. A trough level has been scheduled for 22 at 0900.  The AUC will be at 462 ,mg/l.h  Antimicrobial stop date CONSTANCE Encarnacion Other PharmD

## 2022-02-14 NOTE — PROGRESS NOTES
CM reviewed clinical record. No dc order noted at this time. Current dc plan is dc home with no needs. CM will continue to follow patient's progress and provider recommendations at dc.      Kristy Montano

## 2022-02-14 NOTE — PROGRESS NOTES
Progress Note    Patient: Renata Mora MRN: [id number]  SSN: [id number]    YOB: 1980  Age: 37 y.o. Sex: male      Admit Date: 2/10/2022    LOS: 3 days     Subjective:   Patient followed for cellulitis and abscess involving his let hand. CT scan showed possible phlegmon/developing abscess and possible tenosynovitis. Culture so far growing Group A strep. He was seen by Orthopedics and underwent I&D. He is currently on Vancomycin and Meropenem. Patient resting comfortably at this time. Objective:     Vitals:    02/13/22 1550 02/13/22 1914 02/14/22 0141 02/14/22 0808   BP: 136/89 136/82 131/71 135/82   Pulse: 70 68 63 60   Resp: 18 18 16 14   Temp: 98.8 °F (37.1 °C) 98.8 °F (37.1 °C) 98.2 °F (36.8 °C) 98.1 °F (36.7 °C)   SpO2: 100% 97% 96% 98%   Weight:       Height:            Intake and Output:  Current Shift: No intake/output data recorded. Last three shifts: 02/12 1901 - 02/14 0700  In: 2410 [P.O.:1600; I.V.:810]  Out: -     Physical Exam:   Vitals and nursing note reviewed. Constitutional:       Appearance: He is not ill-appearing. Genitourinary:     Comments: No Robertson  Musculoskeletal:         General: Swelling present. Cervical back: Neck supple. Right lower leg: No edema. Left lower leg: No edema. Comments: Marked swelling dorsum of left hand with small open wound with drainage and erythema, tenderness  Healed lesion left index finger   Skin:     Findings: Erythema and lesion present. No rash. Neurological:      General: No focal deficit present. Mental Status: He is alert and oriented to person, place, and time. Psychiatric:         Mood and Affect: Mood normal.         Behavior: Behavior normal.         Thought Content:  Thought content normal.         Judgment: Judgment normal.      Lab/Data Review:     WBC 7,200    Procal <0.05  CRP 2.04 <2.50 <4.94    Blood cultures (2/10)   Wound culture left hand (2/11) Group A streptococcus  Tissue culture left hand (2/12) Pending  Wound culture left hand (2/12) Pending    CT left hand (2/12)  No growth 2 days        Assessment:     Active Problems:    Abscess of hand (2/11/2022)    1. Cellulitis and possible abscess, dorsum left hand, culture growing Group A strep thus far, Day #4 IV Vancomycin and Meropenem, statuus post I&D  2. Elevated CRP, secondary to #1, resolving  3. Uncontrolled diabetes mellitus  4. Penicillin allergy     Comment: If no MRSA identified, would discontinue Vancomycin. If no Gram negative rods, would discontinue Meropenem and start Levaquin. Plan:      1. Continue IV Vancomycin and Meropenem, possible transition to oral Levaquin in 48 hours  2. Follow-up blood, wound and tissue cultures that are pending  3.  In the am, repeat CRP    Signed By: Danni Merritt MD     February 14, 2022

## 2022-02-14 NOTE — PROGRESS NOTES
Hospitalist Progress Note    Subjective:   Daily Progress Note: 2/14/2022 12:38 PM    Admission Hx:    Chen Norman is a 37 y.o. male with no significant past medical history presents to the ED with chief complaint of left hand pain. He reports sustained trauma at work (crawling on a house), injured himself and was a dirty wound. He was seen in outside ED a week ago and discharged with clindamycin. However the area has become progressively more painful and swollen, and started to spontaneously drain pus today. Also noted streaking up his arm. He otherwise denies fever or chills. \" (Dr. Juan Rivero)     In the ED, vital signs were normal, no leukocytosis. X-ray of and showed no osseous abnormalities. Vancomycin was given.   -------------------------------    Subjective: No issues overnight. Hand bandaged. Evaluated by ortho --> I&D done 2/12 No significant change or pain.       Current Facility-Administered Medications   Medication Dose Route Frequency    vancomycin (VANCOCIN) 1,250 mg in 0.9% sodium chloride 250 mL (VIAL-MATE)  1,250 mg IntraVENous Q12H    sodium chloride (NS) flush 5-40 mL  5-40 mL IntraVENous Q8H    sodium chloride (NS) flush 5-40 mL  5-40 mL IntraVENous PRN    polyethylene glycol (MIRALAX) packet 17 g  17 g Oral DAILY PRN    ondansetron (ZOFRAN ODT) tablet 4 mg  4 mg Oral Q8H PRN    Or    ondansetron (ZOFRAN) injection 4 mg  4 mg IntraVENous Q6H PRN    enoxaparin (LOVENOX) injection 40 mg  40 mg SubCUTAneous DAILY    VANCOMYCIN INFORMATION NOTE 1 Each  1 Each Other Rx Dosing/Monitoring    meropenem (MERREM) 1 g in 0.9% sodium chloride 20 mL IV syringe  1 g IntraVENous Q8H    oxyCODONE-acetaminophen (PERCOCET 10)  mg per tablet 1 Tablet  1 Tablet Oral Q4H PRN        Review of Systems    Constitutional: No - fever, chills    HEENT: No - sore throat, sinus pressure, ear pain    Respiratory: No - cough, sob, wheeze, hemoptysis    Cardiovascular: No - chest pain, palpitations, extremity edema    Gastrointestinal: No - n/v/d/bleeding    Genitourinary: No - dysuria, hematuria    Neurological: No - headache, focal weakness    M/S: see HPI      Objective:     Visit Vitals  /82   Pulse 60   Temp 98.1 °F (36.7 °C)   Resp 14   Ht 6' 1\" (1.854 m)   Wt 86.6 kg (191 lb)   SpO2 98%   BMI 25.20 kg/m²      O2 Device: None (Room air)    Temp (24hrs), Av.5 °F (36.9 °C), Min:98.1 °F (36.7 °C), Max:98.8 °F (37.1 °C)      No intake/output data recorded.  1901 -  0700  In: 2410 [P.O.:1600; I.V.:810]  Out: -     PHYSICAL EXAM    Constitutional: NAD, Awake    Neck: Supple     Cardiovascular: S1S2, no murmur    Respiratory:  Clear breath sounds bilaterally with no wheezes, rales, or rhonchi. GI: Soft, NT; no rigidity; + bowel sounds    Musculoskeletal: Patient is right handed; (Initial exam: Left hand: dorsum with erythematous change and ltd fluctuant area; able to  hand; good capillary refill in fingers; no sensory deficit) - currently, bandaged; no sensory deficit, active movement in fingers    Neurological:  AxOx2; No new focal weakness; No tremors    Psychiatric: Mood appears appropriate     Vascular: Palpable pulses; No edema; good capillary refill in left hand      Data Review    Recent Results (from the past 24 hour(s))   C REACTIVE PROTEIN, QT    Collection Time: 22  7:07 AM   Result Value Ref Range    C-Reactive protein 2.04 (H) 0.00 - 0.60 mg/dL   VANCOMYCIN, RANDOM    Collection Time: 22 11:10 AM   Result Value Ref Range    Vancomycin, random 10.6 ug/mL    Reported dose date Not provided      Reported dose: Not provided Units       CT HAND LT W CONT   Final Result   1. Dorsal soft tissue swelling of the hand with small areas of hypodensity in   the superficial subcutaneous tissues, as detailed above, could represent   phlegmon or developing abscess. 2. Question tenosynovitis involving the flexor tendons through the carpal   tunnel.    3. No findings of acute osseous abnormality. XR HAND LT MIN 3 V   Final Result   No acute bony abnormality. No radiopaque foreign body. Dorsal soft   tissue swelling. Active Problems:    Abscess of hand (2/11/2022)        Assessment/Plan:     #Right hand abscess with:   Wound culture from 2/10/2022 is growing group A beta-hemolytic strep. Wound culture from 2/11/2022 showed similar findings. -Broad-spectrum antibiotics: Vancomycin and cefepime  -ID/Heir --> to determine Abx plans  -Wound care  -Ortho --> I&D on 2/12  - DC ~ 2/15/Tuesday    Per ORTHO:    Status post I&D left hand. Postop day #2. New dressing applied today consisting of OPTi cell Ag rope lightly packed into wound bed. Cover with 4 x 4's, OPTi lock, Kerlix and Coban wrap. Awaiting final antibiotic recommendations per ID. Okay to discharge patient from orthopedics with antibiotics have been established. We will continue with wound care. I discussed this with the patient he states his wife continues to work with home health agency will be able to change his dressing daily. We will provide the patient with some at home supplies prior to discharge. Patient will follow up as an outpatient with Dr. Edgar Grimes in approximately 1 week.     DVT Prophylaxis: On AC  Code Status: Full Code  ______________________________________________________________    Marii Martinez MD

## 2022-02-14 NOTE — PROGRESS NOTES
Orthopedic progress note    Date:2022       Room:[facility]  Patient Kathy Yarbrough     YOB: 1980     Age:41 y.o. Subjective    69-year-old male status post I&D left hand. Postop day #2. Patient doing well. He complains of mild pain of his left hand. Pain medication helps. No other complaints at this time. Objective           Vitals Last 24 Hours:  TEMPERATURE:  Temp  Av.6 °F (37 °C)  Min: 98.1 °F (36.7 °C)  Max: 99.2 °F (37.3 °C)  RESPIRATIONS RANGE: Resp  Av.8  Min: 14  Max: 18  PULSE OXIMETRY RANGE: SpO2  Av.8 %  Min: 96 %  Max: 100 %  PULSE RANGE: Pulse  Av.2  Min: 60  Max: 70  BLOOD PRESSURE RANGE: Systolic (31WDR), RVZ:349 , Min:131 , PJ   ; Diastolic (86SNY), RSZ:00, Min:71, Max:89    Current Facility-Administered Medications   Medication Dose Route Frequency    vancomycin (VANCOCIN) 1,250 mg in 0.9% sodium chloride 250 mL (VIAL-MATE)  1,250 mg IntraVENous Q12H    Vancomycin - Trough to be drawn prior to dose  @ 1300   Other ONCE    sodium chloride (NS) flush 5-40 mL  5-40 mL IntraVENous Q8H    sodium chloride (NS) flush 5-40 mL  5-40 mL IntraVENous PRN    polyethylene glycol (MIRALAX) packet 17 g  17 g Oral DAILY PRN    ondansetron (ZOFRAN ODT) tablet 4 mg  4 mg Oral Q8H PRN    Or    ondansetron (ZOFRAN) injection 4 mg  4 mg IntraVENous Q6H PRN    enoxaparin (LOVENOX) injection 40 mg  40 mg SubCUTAneous DAILY    VANCOMYCIN INFORMATION NOTE 1 Each  1 Each Other Rx Dosing/Monitoring    meropenem (MERREM) 1 g in 0.9% sodium chloride 20 mL IV syringe  1 g IntraVENous Q8H    oxyCODONE-acetaminophen (PERCOCET 10)  mg per tablet 1 Tablet  1 Tablet Oral Q4H PRN      Review of Systems   Constitutional: Negative for malaise/fatigue. Respiratory: Negative for cough, shortness of breath and wheezing. Cardiovascular: Negative for chest pain and palpitations.    Gastrointestinal: Negative for abdominal pain, heartburn, nausea and vomiting. Neurological: Negative for headaches. Musculoskeletal: Denies any numbness/tingling of operative extremity    I/O (24Hr): Intake/Output Summary (Last 24 hours) at 2/14/2022 1048  Last data filed at 2/14/2022 7397  Gross per 24 hour   Intake 1290 ml   Output    Net 1290 ml     Objective  Labs/Imaging/Diagnostics    Labs:  CBC:  Recent Labs     02/12/22  0343   WBC 7.2   RBC 4.13   HGB 13.7   HCT 39.1   MCV 94.7   RDW 13.1        CHEMISTRIES:  Recent Labs     02/12/22  0343      K 4.2      CO2 29   BUN 15   CREA 0.77   CA 9.1   PT/INR:No results for input(s): INR, INREXT in the last 72 hours. No lab exists for component: PROTIME  APTT:No results for input(s): APTT in the last 72 hours. LIVER PROFILE:No results for input(s): AST, ALT in the last 72 hours. No lab exists for component: Yanira Jiménez ALKPHOS  Lab Results   Component Value Date/Time    ALT (SGPT) 15 02/10/2022 10:20 PM    AST (SGOT) 10 (L) 02/10/2022 10:20 PM    Alk. phosphatase 82 02/10/2022 10:20 PM    Bilirubin, total 0.2 02/10/2022 10:20 PM     Wound culture from 2/10/2022 is growing group A beta-hemolytic strep. Wound culture from 2/11/2022 showed similar findings. Tissue cultures from 2/12/2022 show no definitive growth thus far. Blood cultures are negative to date    Physical Exam:  Left hand: Dressing was removed by me. There is moderate bloody drainage seen on dressing. Iodoform packing was removed by me. There is mild serosanguineous drainage seen at site. There is improvement of induration and erythema at site. There is good range of motion of fingers of his left hand with minimal discomfort. Radial pulses palpable. Cap refill is 2 seconds. Left upper extremity neurovascular intact. Assessment//Plan           Patient Active Problem List    Diagnosis Date Noted    Abscess of hand 02/11/2022    Major depressive disorder 12/14/2019     Status post I&D left hand.   Postop day #2.  New dressing applied today consisting of OPTi cell Ag rope lightly packed into wound bed. Cover with 4 x 4's, OPTi lock, Kerlix and Coban wrap. Awaiting final antibiotic recommendations per ID. Okay to discharge patient from orthopedics with antibiotics have been established. We will continue with wound care. I discussed this with the patient he states his wife continues to work with home health agency will be able to change his dressing daily. We will provide the patient with some at home supplies prior to discharge. Patient will follow up as an outpatient with Dr. Theron Torres in approximately 1 week.       Electronically signed by Tran Albright PA-C on 2/14/2022 at 10:48 AM

## 2022-02-15 VITALS
SYSTOLIC BLOOD PRESSURE: 121 MMHG | HEART RATE: 62 BPM | DIASTOLIC BLOOD PRESSURE: 71 MMHG | HEIGHT: 73 IN | TEMPERATURE: 97 F | BODY MASS INDEX: 25.31 KG/M2 | OXYGEN SATURATION: 97 % | WEIGHT: 191 LBS | RESPIRATION RATE: 17 BRPM

## 2022-02-15 LAB — CRP SERPL-MCNC: 1.54 MG/DL (ref 0–0.6)

## 2022-02-15 PROCEDURE — 74011250636 HC RX REV CODE- 250/636: Performed by: INTERNAL MEDICINE

## 2022-02-15 PROCEDURE — 99232 SBSQ HOSP IP/OBS MODERATE 35: CPT | Performed by: INTERNAL MEDICINE

## 2022-02-15 PROCEDURE — 74011000250 HC RX REV CODE- 250: Performed by: INTERNAL MEDICINE

## 2022-02-15 PROCEDURE — 86060 ANTISTREPTOLYSIN O TITER: CPT

## 2022-02-15 PROCEDURE — 86140 C-REACTIVE PROTEIN: CPT

## 2022-02-15 PROCEDURE — 36415 COLL VENOUS BLD VENIPUNCTURE: CPT

## 2022-02-15 PROCEDURE — 86215 DEOXYRIBONUCLEASE ANTIBODY: CPT

## 2022-02-15 PROCEDURE — 74011250637 HC RX REV CODE- 250/637: Performed by: INTERNAL MEDICINE

## 2022-02-15 RX ORDER — LEVOFLOXACIN 5 MG/ML
750 INJECTION, SOLUTION INTRAVENOUS EVERY 24 HOURS
Status: DISCONTINUED | OUTPATIENT
Start: 2022-02-15 | End: 2022-02-15

## 2022-02-15 RX ORDER — LEVOFLOXACIN 750 MG/1
750 TABLET ORAL EVERY 24 HOURS
Qty: 14 TABLET | Refills: 0 | Status: SHIPPED | OUTPATIENT
Start: 2022-02-15 | End: 2022-03-01

## 2022-02-15 RX ADMIN — ENOXAPARIN SODIUM 40 MG: 100 INJECTION SUBCUTANEOUS at 08:22

## 2022-02-15 RX ADMIN — LEVOFLOXACIN 750 MG: 5 INJECTION, SOLUTION INTRAVENOUS at 11:28

## 2022-02-15 RX ADMIN — OXYCODONE AND ACETAMINOPHEN 1 TABLET: 10; 325 TABLET ORAL at 08:32

## 2022-02-15 RX ADMIN — SODIUM CHLORIDE, PRESERVATIVE FREE 1 G: 5 INJECTION INTRAVENOUS at 08:22

## 2022-02-15 NOTE — PROGRESS NOTES
Orthopedic progress note    Date:2/15/2022       Room:[facility] [place]  Patient Leticia Gonzalez     YOB: 1980     Age:41 y.o. Subjective    Status post I&D left hand. Postop day #3. Patient doing well. Complains of minimal pain of his left hand. No other complaints at this time. Objective           Vitals Last 24 Hours:  TEMPERATURE:  Temp  Av.8 °F (36.6 °C)  Min: 97 °F (36.1 °C)  Max: 98.2 °F (36.8 °C)  RESPIRATIONS RANGE: Resp  Av.3  Min: 16  Max: 17  PULSE OXIMETRY RANGE: SpO2  Av.5 %  Min: 97 %  Max: 98 %  PULSE RANGE: Pulse  Av.5  Min: 62  Max: 77  BLOOD PRESSURE RANGE: Systolic (30OAK), PSH:665 , Min:121 , LVA:882   ; Diastolic (35IBO), GPO:90, Min:71, Max:80    Current Facility-Administered Medications   Medication Dose Route Frequency    levoFLOXacin (LEVAQUIN) 750 mg in D5W IVPB  750 mg IntraVENous Q24H    sodium chloride (NS) flush 5-40 mL  5-40 mL IntraVENous PRN    polyethylene glycol (MIRALAX) packet 17 g  17 g Oral DAILY PRN    ondansetron (ZOFRAN ODT) tablet 4 mg  4 mg Oral Q8H PRN    Or    ondansetron (ZOFRAN) injection 4 mg  4 mg IntraVENous Q6H PRN    enoxaparin (LOVENOX) injection 40 mg  40 mg SubCUTAneous DAILY    oxyCODONE-acetaminophen (PERCOCET 10)  mg per tablet 1 Tablet  1 Tablet Oral Q4H PRN          I/O (24Hr): Intake/Output Summary (Last 24 hours) at 2/15/2022 1205  Last data filed at 2022 2306  Gross per 24 hour   Intake 1500 ml   Output    Net 1500 ml     Objective  Labs/Imaging/Diagnostics    Labs:  CBC:No results for input(s): WBC, RBC, HGB, HCT, MCV, RDW, PLT, HGBEXT, HCTEXT, PLTEXT in the last 72 hours. CHEMISTRIES:No results for input(s): NA, K, CL, CO2, BUN, CREA, CA, PHOS, MG in the last 72 hours. No lab exists for component: GLUCOSEPT/INR:No results for input(s): INR, INREXT in the last 72 hours. No lab exists for component: PROTIME  APTT:No results for input(s): APTT in the last 72 hours.   LIVER PROFILE:No results for input(s): AST, ALT in the last 72 hours. No lab exists for component: Starford Starch, ALKPHOS  Lab Results   Component Value Date/Time    ALT (SGPT) 15 02/10/2022 10:20 PM    AST (SGOT) 10 (L) 02/10/2022 10:20 PM    Alk. phosphatase 82 02/10/2022 10:20 PM    Bilirubin, total 0.2 02/10/2022 10:20 PM       Physical Exam:  Left hand: Dressing was removed by me. There was mild serosanguineous drainage seen left hand dressing. Packing was removed by me. Wound bed is healing well. Mild erythema seen around site. Minimal swelling seen left hand. Good range of motion fingers left hand with minimal discomfort. Assessment//Plan           Patient Active Problem List    Diagnosis Date Noted    Abscess of hand 02/11/2022    Major depressive disorder 12/14/2019     Status post I&D left hand. Postop day #3. New dressing applied today. Okay for patient be discharged once cleared by medicine and appropriate antibiotics has been determined by infectious disease. Patient follow-up with Dr. Katie Khan as outpatient in approximately 1 week.       Electronically signed by Cristina Jesus PA-C on 2/15/2022 at 12:05 PM

## 2022-02-15 NOTE — DISCHARGE SUMMARY
Hospitalist Discharge Summary     Patient ID:    Ronen Wilkes  [id number]  33 y.o.  1980    Admit date: 2/10/2022    Discharge date : 2/15/2022    Chronic Diagnoses:    Problem List as of 2/15/2022 Never Reviewed          Codes Class Noted - Resolved    Abscess of hand ICD-10-CM: L02.519  ICD-9-CM: 682.4  2/11/2022 - Present        Major depressive disorder ICD-10-CM: F32.9  ICD-9-CM: 296.20  12/14/2019 - Present          22    Final Diagnoses: Active Problems:    Abscess of hand (2/11/2022)        Reason for Hospitalization:  Left hand abscess    Hospital Course:     Ronen Wilkes is a 37 y.o. male with no significant past medical history presents to the ED with chief complaint of left hand pain. He reports sustained trauma at work (crawling on a house), injured himself and was a dirty wound. He was seen in outside ED a week ago and discharged with clindamycin. However the area has become progressively more painful and swollen, and started to spontaneously drain pus today. Also noted streaking up his arm. Pt was started on IV antibiotics and ID consulted for management. Wound culture growing gram positive cocci and orthopedics performed an I &D of the abscess. He will continue with oral antibiotics per ID for 14 days, follow up with orthopedics in 1 week and follow up with ID in 14 days. Pt is stable for discharge home today with family. Discharge Medications:   Current Discharge Medication List      START taking these medications    Details   levoFLOXacin (LEVAQUIN) 750 mg tablet Take 1 Tablet by mouth every twenty-four (24) hours for 14 days. Qty: 14 Tablet, Refills: 0  Start date: 2/15/2022, End date: 3/1/2022         CONTINUE these medications which have NOT CHANGED    Details   buPROPion XL (WELLBUTRIN XL) 150 mg tablet Take 1 Tab by mouth daily.  Indications: Anxiousness associated with Depression  Qty: 30 Tab, Refills: 0         STOP taking these medications ibuprofen (MOTRIN) 800 mg tablet Comments:   Reason for Stopping:         clindamycin (CLEOCIN) 300 mg capsule Comments:   Reason for Stopping: Follow up Care:    1. None in 1-2 weeks. Follow-up Information     Follow up With Specialties Details Why Contact Info    Kathya Vernon MD Orthopedic Surgery In 1 week  [address] [facility] [address] [facility] [address]  [phone]      Heather Whittaker MD Infectious Disease In 2 weeks  [address]  [phone]              * Follow-up Care/Patient Instructions: Activity: Activity as tolerated  Diet: Regular Diet  Wound Care: As directed   OPTi cell Ag rope lightly packed into wound bed. Cover with 4 x 4's, OPTi lock, Kerlix and Coban wrap. Change 3 times per day. Condition at Discharge:  Stable  __________________________________________________________________    Disposition  Home or Self Care  ____________________________________________________________________    Code Status:  Full Code  ___________________________________________________________________    Discharge Exam:  Patient seen and examined by me on discharge day. Physical Exam  Constitutional:       General: He is not in acute distress. HENT:      Head: Normocephalic and atraumatic. Cardiovascular:      Rate and Rhythm: Normal rate and regular rhythm. Pulses: Normal pulses. Heart sounds: Normal heart sounds. Abdominal:      General: Bowel sounds are normal.      Palpations: Abdomen is soft. Musculoskeletal:         General: Normal range of motion. Skin:     General: Skin is warm and dry. Comments: Left hand dressing intact   Neurological:      Mental Status: He is oriented to person, place, and time.    Psychiatric:         Mood and Affect: Mood normal.         Behavior: Behavior normal.          CONSULTATIONS: ID, Orthopedic surgery    Significant Diagnostic Studies:   Recent Results (from the past 24 hour(s))   Rey Frederick    Collection Time: 02/14/22  8:29 PM   Result Value Ref Range    Vancomycin,trough 20.0 (H) 5.0 - 10.0 ug/mL    Reported dose date Dose Dependent      Reported dose: Dose Dependent Units   C REACTIVE PROTEIN, QT    Collection Time: 02/15/22  8:38 AM   Result Value Ref Range    C-Reactive protein 1.54 (H) 0.00 - 0.60 mg/dL     CT HAND LT W CONT   Final Result   1. Dorsal soft tissue swelling of the hand with small areas of hypodensity in   the superficial subcutaneous tissues, as detailed above, could represent   phlegmon or developing abscess. 2. Question tenosynovitis involving the flexor tendons through the carpal   tunnel. 3. No findings of acute osseous abnormality. XR HAND LT MIN 3 V   Final Result   No acute bony abnormality. No radiopaque foreign body. Dorsal soft   tissue swelling. Discharge: time spent 35 minutes in discharge  Education and counseling.      Signed:  Marie Mahajan NP  2/15/2022  12:10 PM

## 2022-02-15 NOTE — DISCHARGE INSTRUCTIONS

## 2022-02-15 NOTE — PROGRESS NOTES
Progress Note    Patient: Abraham Smith MRN: [id number]  SSN: [id number]    YOB: 1980  Age: 37 y.o. Sex: male      Admit Date: 2/10/2022    LOS: 4 days     Subjective:   Patient followed for cellulitis and abscess involving his let hand. CT scan showed possible phlegmon/developing abscess and possible tenosynovitis. Culture so far growing Group A strep. He was seen by Orthopedics and underwent I&D. He is currently on Vancomycin and Meropenem. Patient has apparently been discharged after threatening to leave [place]. Objective:     Vitals:    02/14/22 1519 02/14/22 1906 02/15/22 0250 02/15/22 0707   BP: 129/78 123/73 127/80 121/71   Pulse: 74 77 69 62   Resp: 16 16 16 17   Temp: 98.2 °F (36.8 °C) 98.1 °F (36.7 °C) 98 °F (36.7 °C) 97 °F (36.1 °C)   SpO2: 97% 98% 98% 97%   Weight:       Height:            Intake and Output:  Current Shift: No intake/output data recorded. Last three shifts: 02/13 1901 - 02/15 0700  In: 2990 [P.O.:2700; I.V.:290]  Out: -     Physical Exam:   Vitals and nursing note reviewed. Patient unavailable for re-examination  Constitutional:       Appearance: He is not ill-appearing. Genitourinary:     Comments: No Robertson  Musculoskeletal:         General: Swelling present. Cervical back: Neck supple. Right lower leg: No edema. Left lower leg: No edema. Comments: Marked swelling dorsum of left hand with small open wound with drainage and erythema, tenderness  Healed lesion left index finger   Skin:     Findings: Erythema and lesion present. No rash. Neurological:      General: No focal deficit present. Mental Status: He is alert and oriented to person, place, and time. Psychiatric:         Mood and Affect: Mood normal.         Behavior: Behavior normal.         Thought Content:  Thought content normal.         Judgment: Judgment normal.      Lab/Data Review:     WBC 7,200    Procal <0.05  CRP 1.54 <2.04 <2.50 <4.94    Blood cultures (2/10) Wound culture left hand (2/11) Group A streptococcus FINAL  Tissue culture left hand (2/12) Gram positive cocci in chains  Wound culture left hand (2/12) Scant Gram positive cocci    CT left hand (2/12)  No growth 2 days        Assessment:     Active Problems:    Abscess of hand (2/11/2022)    1. Cellulitis and possible abscess, dorsum left hand, culture growing Group A strep thus far, Day #4 IV Vancomycin and Meropenem, statuus post I&D  2. Elevated CRP, secondary to #1, resolving  3. Uncontrolled diabetes mellitus  4. Penicillin allergy     Comment: It appears that infection is due primarily to Group A strep with nor MRSA identified. Comfortable with de-escalation now. Plan:      1. Discontinue Vancomycin and Meropenem  2. Not unreasonable to transition to oral Levaquin 750 mg po daily for 14 days  3. Follow-up in ID Clinic in 2 weeks  4. Will follow-up blood, wound and tissue cultures that are pending  4.  In the am, repeat CRP    Signed By: Taylor Donaldson MD     February 15, 2022

## 2022-02-15 NOTE — PROGRESS NOTES
[address] February 15, 2022       RE: Renata How      To Whom It May Concern,    This is to certify that Renata How may to work on 3/21/2022    Please feel free to contact my office if you have any questions or concerns. Thank you for your assistance in this matter.       Sincerely,  Marielle Riojas NP

## 2022-02-15 NOTE — PROGRESS NOTES
Physician Progress Note      Tri Braun  CSN #:                  [id number]  :                       1980  ADMIT DATE:       2/10/2022 8:26 PM  [address] DATE:        2/15/2022 12:41 PM  RESPONDING  PROVIDER #:        Hernandez BALES PA-C          QUERY TEXT:    Patient admitted with left hand abscess. Per progress note dated  documentation of needing debridement.  progress note states 's/p left hand abscess I&D'. To accurately reflect the procedure performed please document if debridement was excisional or nonexcisional and the deepest depth of tissue removed as down to and including: The medical record reflects the following:  Risk Factors: 36 yo male with left hand abscess, cellulitis, DM  Clinical Indicators: Consult: Abscess left hand-This patient will require surgical incision debridement of his abscess;   PN: status post I&D left hand  Treatment: IV Vancomycin, Meropenem    Thank you,  Hayley Cuevas RN, CCDS, CPC  Options provided:  -- Nonexcisional debridement of skin  -- Excisional debridement of skin  -- Nonexcisional debridement of subcutaneous tissue  -- Excisional debridement of subcutaneous tissue  -- Nonexcisional debridement of fascia  -- Excisional debridement of fascia  -- Nonexcisional debridement of muscle  -- Excisional debridement of muscle  -- Nonexcisional debridement of bone  -- Excisional debridement of bone  -- Other - I will add my own diagnosis  -- Disagree - Not applicable / Not valid  -- Disagree - Clinically unable to determine / Unknown  -- Refer to Clinical Documentation Reviewer    PROVIDER RESPONSE TEXT:    Excisional debridement of subcutaneous tissue of left hand was performed during procedure on 22.     Query created by: Taniya Diggs on 2022 1:19 PM      Electronically signed by:  Paz Guajardo PA-C 2/15/2022 1:00 PM

## 2022-02-15 NOTE — PROGRESS NOTES
Pt has discharge orders home with self care. Spoke with attending provider and consults. Both stated the pt is able to discharge. Pt is discharging without an IV, tele or bell. Pt has follow up appointments made and prescriptions sent to his preferred pharmacy. Discharge plan of care/case management plan validated with provider discharge order. Reviewed discharge instructions with pt. Pt verbalized understanding of instructions. Pt was wheeled down in a wheelchair and left in a private vehicle.

## 2022-02-15 NOTE — PROGRESS NOTES
Problem: Falls - Risk of  Goal: *Absence of Falls  Description: Document Cullen Blight Fall Risk and appropriate interventions in the flowsheet.   Outcome: Progressing Towards Goal  Note: Fall Risk Interventions:            Medication Interventions: Teach patient to arise slowly         History of Falls Interventions: Vital signs minimum Q4HRs X 24 hrs (comment for end date)         Problem: Patient Education: Go to Patient Education Activity  Goal: Patient/Family Education  Outcome: Progressing Towards Goal

## 2022-02-16 LAB
ASO AB SERPL-ACNC: 301 IU/ML (ref 0–200)
BACTERIA SPEC CULT: NORMAL
GRAM STN SPEC: NORMAL
SPECIAL REQUESTS,SREQ: NORMAL
SPECIAL REQUESTS,SREQ: NORMAL

## 2022-02-17 LAB — STREP DNASE B SER-ACNC: 817 U/ML (ref 0–120)

## 2022-02-17 NOTE — ADT AUTH CERT NOTES
Comments  Comment            Patient Demographics    Patient Name   Beverley Soliman   [id number] Legal Sex   Male    1980 Address   [address] [address] [facility] [address] [facility] [address] Phone   [phone] (Home) *Preferred*   [phone] (Mobile)     Patient Demographics    Patient Name   Beverley Soliman   [id number] Legal Sex   Male    1980 Address   [address] [place] [address] Phone   [phone] (Home) *Preferred*   [phone] (Mobile)   CSN:   [id number]     [address] [place] [address] Date: Admit Time Room Bed   Feb 10, 2022  8:26 PM [phone]       Attending Providers    Provider Pager From To   Kimmy Dobbins MD  02/10/22 02/11/22   Fredo Burciaga MD  22   Sury Martin MD  22   Fredo Burciaga MD  22   Sury Martin MD  22   Kristin Hernandez MD  02/11/22 02/15/22   Sury Martin MD  02/15/22 02/15/22     Emergency Contact(s)    Name Relation Home Work Mobile   not, given Other Non-relative [phone]     Melissa Gan Spouse   [phone]     Utilization Reviews         -   clinicals by Chelsie Randolph       Review Entered Review Status   2022 09:39 In Primary      Criteria Review   I/D surgery clinicals for -    clinicals         Postop day 1, status post left hand abscess I&D. Patient is doing well. He is afebrile. Pain is well controlled.     Examination his hand dressing is intact. No bleeding or drainage. Fingers are slightly swollen but otherwise warm and pink. Normal sensations.     Cultures are pending.     Continue IV antibiotic therapy for another 24 to 48 hours. Currently, he is on vancomycin and meropenem. ID team is on board.      dressing change tomorrow and remove the packing at that time. Possible discharge on Tuesday.   Oral antibiotic therapy will be tailored based on his culture and sensitivity results         ID note  Subjective:   Patient followed for cellulitis and abscess involving his let hand.  CT scan showed possible phlegmon/developing abscess and possible tenosynovitis. Culture so far growing Group A strep. He was seen by Orthopedics and underwent I&D. [facility] is currently on Vancomycin and Meropenem.  Patient resting comfortably at this time. Objective:                  Vitals:     02/13/22 1550 02/13/22 1914 02/14/22 0141 02/14/22 0808   BP: 136/89 136/82 131/71 135/82   Pulse: 70 68 63 60   Resp: 18 18 16 14         Assessment:      Active Problems:    Abscess of hand (2/11/2022)     1. Cellulitis and possible abscess, dorsum left hand, culture growing Group A strep thus far, Day #4 IV Vancomycin and Meropenem, statuus post I&D  2. Elevated CRP, secondary to #1, resolving  3. Uncontrolled diabetes mellitus  4. Penicillin allergy     Plan:      1. Continue IV Vancomycin and Meropenem, possible transition to oral Levaquin in 48 hours  2. Follow-up blood, wound and tissue cultures that are pending  3.  In the am, repeat CRP

## 2022-02-18 LAB
BACTERIA SPEC CULT: NORMAL
SPECIAL REQUESTS,SREQ: NORMAL

## 2022-03-19 PROBLEM — L02.519 ABSCESS OF HAND: Status: ACTIVE | Noted: 2022-02-11

## 2022-03-19 PROBLEM — F32.9 MAJOR DEPRESSIVE DISORDER: Status: ACTIVE | Noted: 2019-12-14

## 2024-03-04 ENCOUNTER — HOSPITAL ENCOUNTER (EMERGENCY)
Facility: HOSPITAL | Age: 44
Discharge: HOME | End: 2024-03-04
Attending: EMERGENCY MEDICINE
Payer: MEDICAID

## 2024-03-04 VITALS
OXYGEN SATURATION: 98 % | TEMPERATURE: 98.9 F | HEART RATE: 67 BPM | WEIGHT: 187 LBS | BODY MASS INDEX: 24.78 KG/M2 | HEIGHT: 73 IN | DIASTOLIC BLOOD PRESSURE: 86 MMHG | SYSTOLIC BLOOD PRESSURE: 143 MMHG | RESPIRATION RATE: 16 BRPM

## 2024-03-04 DIAGNOSIS — K02.9 DENTAL CARIES: Primary | ICD-10-CM

## 2024-03-04 DIAGNOSIS — K08.89 PAIN, DENTAL: ICD-10-CM

## 2024-03-04 PROCEDURE — 99283 EMERGENCY DEPT VISIT LOW MDM: CPT

## 2024-03-04 PROCEDURE — 6370000000 HC RX 637 (ALT 250 FOR IP): Performed by: EMERGENCY MEDICINE

## 2024-03-04 RX ORDER — CLINDAMYCIN HYDROCHLORIDE 300 MG/1
300 CAPSULE ORAL 3 TIMES DAILY
Qty: 21 CAPSULE | Refills: 0 | Status: SHIPPED | OUTPATIENT
Start: 2024-03-04 | End: 2024-03-11

## 2024-03-04 RX ORDER — IBUPROFEN 600 MG/1
600 TABLET ORAL 3 TIMES DAILY PRN
Qty: 30 TABLET | Refills: 0 | Status: SHIPPED | OUTPATIENT
Start: 2024-03-04

## 2024-03-04 RX ORDER — CLINDAMYCIN HYDROCHLORIDE 150 MG/1
300 CAPSULE ORAL EVERY 6 HOURS SCHEDULED
Status: DISCONTINUED | OUTPATIENT
Start: 2024-03-04 | End: 2024-03-04

## 2024-03-04 RX ORDER — IBUPROFEN 600 MG/1
600 TABLET ORAL
Status: COMPLETED | OUTPATIENT
Start: 2024-03-04 | End: 2024-03-04

## 2024-03-04 RX ORDER — OXYCODONE HYDROCHLORIDE 5 MG/1
5 TABLET ORAL
Status: COMPLETED | OUTPATIENT
Start: 2024-03-04 | End: 2024-03-04

## 2024-03-04 RX ORDER — CLINDAMYCIN HYDROCHLORIDE 150 MG/1
300 CAPSULE ORAL
Status: COMPLETED | OUTPATIENT
Start: 2024-03-04 | End: 2024-03-04

## 2024-03-04 RX ORDER — OXYCODONE HYDROCHLORIDE 5 MG/1
5 TABLET ORAL EVERY 6 HOURS PRN
Qty: 12 TABLET | Refills: 0 | Status: SHIPPED | OUTPATIENT
Start: 2024-03-04 | End: 2024-03-07

## 2024-03-04 RX ADMIN — CLINDAMYCIN HYDROCHLORIDE 300 MG: 150 CAPSULE ORAL at 20:23

## 2024-03-04 RX ADMIN — BENZOCAINE, BUTAMBEN, AND TETRACAINE HYDROCHLORIDE: .028; .004; .004 AEROSOL, SPRAY TOPICAL at 20:23

## 2024-03-04 RX ADMIN — OXYCODONE 5 MG: 5 TABLET ORAL at 20:23

## 2024-03-04 RX ADMIN — IBUPROFEN 600 MG: 600 TABLET, FILM COATED ORAL at 20:22

## 2024-03-04 RX ADMIN — OXYCODONE HYDROCHLORIDE 5 MG: 5 TABLET ORAL at 20:24

## 2024-03-04 ASSESSMENT — PAIN - FUNCTIONAL ASSESSMENT
PAIN_FUNCTIONAL_ASSESSMENT: ACTIVITIES ARE NOT PREVENTED
PAIN_FUNCTIONAL_ASSESSMENT: 0-10
PAIN_FUNCTIONAL_ASSESSMENT: ACTIVITIES ARE NOT PREVENTED

## 2024-03-04 ASSESSMENT — PAIN DESCRIPTION - ORIENTATION
ORIENTATION: RIGHT;LEFT

## 2024-03-04 ASSESSMENT — PAIN SCALES - GENERAL
PAINLEVEL_OUTOF10: 8

## 2024-03-04 ASSESSMENT — PAIN DESCRIPTION - LOCATION
LOCATION: TEETH

## 2024-03-04 ASSESSMENT — PAIN DESCRIPTION - FREQUENCY
FREQUENCY: CONTINUOUS
FREQUENCY: CONTINUOUS

## 2024-03-04 ASSESSMENT — PAIN DESCRIPTION - DESCRIPTORS
DESCRIPTORS: ACHING

## 2024-03-04 ASSESSMENT — LIFESTYLE VARIABLES
HOW OFTEN DO YOU HAVE A DRINK CONTAINING ALCOHOL: NEVER
HOW MANY STANDARD DRINKS CONTAINING ALCOHOL DO YOU HAVE ON A TYPICAL DAY: PATIENT DOES NOT DRINK

## 2024-03-04 ASSESSMENT — PAIN DESCRIPTION - PAIN TYPE
TYPE: ACUTE PAIN
TYPE: ACUTE PAIN

## 2024-03-04 ASSESSMENT — PAIN DESCRIPTION - ONSET
ONSET: ON-GOING
ONSET: ON-GOING

## 2024-03-05 NOTE — ED PROVIDER NOTES
[facility] EMERGENCY DEP  EMERGENCY DEPARTMENT ENCOUNTER       Pt Name: Javy Benitze  MRN: [id number]  Birthdate 1980  Date of evaluation: 3/4/2024  Provider: Shaina Lopez MD   PCP: No primary care provider on file.  Note Started: 12:00 AM EST 3/5/24     CHIEF COMPLAINT       Chief Complaint   Patient presents with    Dental Problem        HISTORY OF PRESENT ILLNESS: 1 or more elements      History From: Patient  HPI Limitations: None     Javy Benitez is a 43 y.o. male who presents to the ED with chief complaint of bilateral upper and lower dental pain.  Patient reports he has \"bad teeth\".  He is having the worst pain in his posterior right molar, but also has pain in his lower right, upper left, lower left teeth.  He has been having dental pain for quite some time, but states that has been especially worse in the last few hours.  He took Tylenol with minimal relief.  He has a dentist appointment scheduled on 3/12/2024, but could not get in to be seen any sooner.  He is concerned about possible swelling of his right jaw.  Denies any fevers.  No difficulty opening or closing his mouth.  No difficulty breathing or swallowing.  REVIEW OF SYSTEMS      Review of Systems     Positives and Pertinent negatives as per HPI.    PAST HISTORY     Past Medical History:  History reviewed. No pertinent past medical history.      Past Surgical History:  History reviewed. No pertinent surgical history.    Family History:  History reviewed. No pertinent family history.    Social History:  Social History     Tobacco Use    Smoking status: Every Day     Types: Cigarettes    Smokeless tobacco: Never   Vaping Use    Vaping Use: Never used   Substance Use Topics    Alcohol use: Not Currently    Drug use: Not Currently     Types: Marijuana (Weed)       Allergies:  Allergies   Allergen Reactions    Penicillins Anaphylaxis       CURRENT MEDICATIONS      Discharge Medication List as of 3/4/2024  8:02 PM          SCREENINGS

## 2024-03-05 NOTE — DISCHARGE INSTRUCTIONS
You were prescribed antibiotics and narcotic pain medicine.  You were also prescribed a prescription strength of ibuprofen.  In addition to these medications, you may also take 1000 mg of Tylenol/acetaminophen every 6 hours.  Do not take more than that as it can be damaging to your liver.

## 2024-03-05 NOTE — ED TRIAGE NOTES
Pt reports that he believes he has and abscess to right lower back tooth and to left upper back tooth. Has had pain x 4-5 hours. Took Tylenol 4-5 hours ago with minimal relief of pain.

## 2024-06-08 ENCOUNTER — HOSPITAL ENCOUNTER (EMERGENCY)
Facility: HOSPITAL | Age: 44
Discharge: ANOTHER HOSPITAL | End: 2024-06-09
Attending: EMERGENCY MEDICINE
Payer: MEDICAID

## 2024-06-08 ENCOUNTER — APPOINTMENT (OUTPATIENT)
Facility: HOSPITAL | Age: 44
End: 2024-06-08
Payer: MEDICAID

## 2024-06-08 DIAGNOSIS — S42.331A CLOSED DISPLACED OBLIQUE FRACTURE OF SHAFT OF RIGHT HUMERUS, INITIAL ENCOUNTER: ICD-10-CM

## 2024-06-08 DIAGNOSIS — S09.90XA INJURY OF HEAD, INITIAL ENCOUNTER: ICD-10-CM
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
## 2024-06-08 LAB
ALBUMIN SERPL-MCNC: 4 G/DL (ref 3.5–5)
ALBUMIN/GLOB SERPL: 1.3 (ref 1.1–2.2)
ALP SERPL-CCNC: 88 U/L (ref 45–117)
ALT SERPL-CCNC: 33 U/L (ref 12–78)
ANION GAP SERPL CALC-SCNC: 11 MMOL/L (ref 5–15)
AST SERPL-CCNC: 30 U/L (ref 15–37)
BASOPHILS # BLD: 0 K/UL (ref 0–0.1)
BASOPHILS NFR BLD: 0 % (ref 0–1)
BILIRUB SERPL-MCNC: 0.3 MG/DL (ref 0.2–1)
BUN SERPL-MCNC: 13 MG/DL (ref 6–20)
BUN/CREAT SERPL: 13 (ref 12–20)
CALCIUM SERPL-MCNC: 8.8 MG/DL (ref 8.5–10.1)
CHLORIDE SERPL-SCNC: 102 MMOL/L (ref 97–108)
CO2 SERPL-SCNC: 28 MMOL/L (ref 21–32)
CREAT SERPL-MCNC: 1.03 MG/DL (ref 0.7–1.3)
DIFFERENTIAL METHOD BLD: ABNORMAL
EOSINOPHIL # BLD: 0.2 K/UL (ref 0–0.4)
EOSINOPHIL NFR BLD: 1 % (ref 0–7)
ERYTHROCYTE [DISTWIDTH] IN BLOOD BY AUTOMATED COUNT: 13.5 % (ref 11.5–14.5)
GLOBULIN SER CALC-MCNC: 3.1 G/DL (ref 2–4)
GLUCOSE SERPL-MCNC: 105 MG/DL (ref 65–100)
HCT VFR BLD AUTO: 40.3 % (ref 36.6–50.3)
HGB BLD-MCNC: 14.4 G/DL (ref 12.1–17)
IMM GRANULOCYTES # BLD AUTO: 0.1 K/UL (ref 0–0.04)
IMM GRANULOCYTES NFR BLD AUTO: 1 % (ref 0–0.5)
INR PPP: 1 (ref 0.9–1.1)
LIPASE SERPL-CCNC: 47 U/L (ref 13–75)
LYMPHOCYTES # BLD: 2.5 K/UL (ref 0.8–3.5)
LYMPHOCYTES NFR BLD: 19 % (ref 12–49)
MAGNESIUM SERPL-MCNC: 1.6 MG/DL (ref 1.6–2.4)
MCH RBC QN AUTO: 32.7 PG (ref 26–34)
MCHC RBC AUTO-ENTMCNC: 35.7 G/DL (ref 30–36.5)
MCV RBC AUTO: 91.4 FL (ref 80–99)
MONOCYTES # BLD: 0.9 K/UL (ref 0–1)
MONOCYTES NFR BLD: 7 % (ref 5–13)
NEUTS SEG # BLD: 9.2 K/UL (ref 1.8–8)
NEUTS SEG NFR BLD: 72 % (ref 32–75)
NRBC # BLD: 0 K/UL (ref 0–0.01)
NRBC BLD-RTO: 0 PER 100 WBC
PLATELET # BLD AUTO: 162 K/UL (ref 150–400)
PMV BLD AUTO: 12.1 FL (ref 8.9–12.9)
POTASSIUM SERPL-SCNC: 3.7 MMOL/L (ref 3.5–5.1)
PROT SERPL-MCNC: 7.1 G/DL (ref 6.4–8.2)
PROTHROMBIN TIME: 10.1 SEC (ref 9–11.1)
RBC # BLD AUTO: 4.41 M/UL (ref 4.1–5.7)
SODIUM SERPL-SCNC: 141 MMOL/L (ref 136–145)
TROPONIN I SERPL HS-MCNC: 8 NG/L (ref 0–76)
WBC # BLD AUTO: 12.8 K/UL (ref 4.1–11.1)

## 2024-06-08 PROCEDURE — 85025 COMPLETE CBC W/AUTO DIFF WBC: CPT

## 2024-06-08 PROCEDURE — 73060 X-RAY EXAM OF HUMERUS: CPT

## 2024-06-08 PROCEDURE — 83690 ASSAY OF LIPASE: CPT

## 2024-06-08 PROCEDURE — 85610 PROTHROMBIN TIME: CPT

## 2024-06-08 PROCEDURE — 99285 EMERGENCY DEPT VISIT HI MDM: CPT

## 2024-06-08 PROCEDURE — 71045 X-RAY EXAM CHEST 1 VIEW: CPT

## 2024-06-08 PROCEDURE — 84484 ASSAY OF TROPONIN QUANT: CPT

## 2024-06-08 PROCEDURE — 83735 ASSAY OF MAGNESIUM: CPT

## 2024-06-08 PROCEDURE — 72020 X-RAY EXAM OF SPINE 1 VIEW: CPT

## 2024-06-08 PROCEDURE — 80053 COMPREHEN METABOLIC PANEL: CPT

## 2024-06-08 PROCEDURE — 72170 X-RAY EXAM OF PELVIS: CPT

## 2024-06-08 PROCEDURE — 36415 COLL VENOUS BLD VENIPUNCTURE: CPT

## 2024-06-08 PROCEDURE — 2580000003 HC RX 258: Performed by: EMERGENCY MEDICINE

## 2024-06-08 RX ORDER — 0.9 % SODIUM CHLORIDE 0.9 %
1000 INTRAVENOUS SOLUTION INTRAVENOUS ONCE
Status: COMPLETED | OUTPATIENT
Start: 2024-06-08 | End: 2024-06-08

## 2024-06-08 RX ADMIN — SODIUM CHLORIDE 1000 ML: 9 INJECTION, SOLUTION INTRAVENOUS at 23:08

## 2024-06-08 ASSESSMENT — LIFESTYLE VARIABLES
HOW MANY STANDARD DRINKS CONTAINING ALCOHOL DO YOU HAVE ON A TYPICAL DAY: PATIENT DOES NOT DRINK
HOW OFTEN DO YOU HAVE A DRINK CONTAINING ALCOHOL: NEVER

## 2024-06-08 ASSESSMENT — ENCOUNTER SYMPTOMS
BACK PAIN: 0
DIARRHEA: 0
VOMITING: 0
SHORTNESS OF BREATH: 0
ABDOMINAL PAIN: 0
NAUSEA: 0

## 2024-06-08 ASSESSMENT — PAIN DESCRIPTION - LOCATION: LOCATION: FACE

## 2024-06-08 ASSESSMENT — PAIN SCALES - GENERAL: PAINLEVEL_OUTOF10: 10

## 2024-06-08 ASSESSMENT — PAIN - FUNCTIONAL ASSESSMENT: PAIN_FUNCTIONAL_ASSESSMENT: 0-10

## 2024-06-09 VITALS
OXYGEN SATURATION: 96 % | HEART RATE: 73 BPM | HEIGHT: 72 IN | WEIGHT: 190 LBS | RESPIRATION RATE: 18 BRPM | TEMPERATURE: 98 F | BODY MASS INDEX: 25.73 KG/M2 | SYSTOLIC BLOOD PRESSURE: 136 MMHG | DIASTOLIC BLOOD PRESSURE: 83 MMHG

## 2024-06-09 ASSESSMENT — PAIN SCALES - GENERAL: PAINLEVEL_OUTOF10: 7

## 2024-06-09 ASSESSMENT — PAIN DESCRIPTION - ORIENTATION: ORIENTATION: RIGHT

## 2024-06-09 ASSESSMENT — PAIN DESCRIPTION - LOCATION: LOCATION: ARM

## 2024-06-09 NOTE — ED NOTES
TRANSFER - OUT REPORT:    Verbal report given to Elissa WOODS RN on Javy Benitez  being transferred to [facility] for routine progression of patient care       Report consisted of patient's Situation, Background, Assessment and   Recommendations(SBAR).     Information from the following report(s) Nurse Handoff Report, ED SBAR, and Event Log was reviewed with the receiving nurse.    [place] Fall Assessment:    Presents to emergency department  because of falls (Syncope, seizure, or loss of consciousness): No  Age > 70: No  Altered Mental Status, Intoxication with alcohol or substance confusion (Disorientation, impaired judgment, poor safety awaremess, or inability to follow instructions): No  Impaired Mobility: Ambulates or transfers with assistive devices or assistance; Unable to ambulate or transer.: No  Nursing Judgement: No          Lines:   Peripheral IV 06/08/24 Left Antecubital (Active)        Opportunity for questions and clarification was provided.      Patient transported with:  Monitor
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
## 2024-06-13 ENCOUNTER — HOSPITAL ENCOUNTER (EMERGENCY)
Facility: HOSPITAL | Age: 44
Discharge: HOME | End: 2024-06-14
Attending: EMERGENCY MEDICINE
Payer: MEDICAID

## 2024-06-13 VITALS
HEIGHT: 73 IN | BODY MASS INDEX: 26.37 KG/M2 | RESPIRATION RATE: 16 BRPM | OXYGEN SATURATION: 98 % | HEART RATE: 63 BPM | SYSTOLIC BLOOD PRESSURE: 117 MMHG | WEIGHT: 199 LBS | TEMPERATURE: 97.7 F | DIASTOLIC BLOOD PRESSURE: 71 MMHG

## 2024-06-13 DIAGNOSIS — S42.351A CLOSED DISPLACED COMMINUTED FRACTURE OF SHAFT OF RIGHT HUMERUS, INITIAL ENCOUNTER: Primary | ICD-10-CM

## 2024-06-13 PROCEDURE — 99283 EMERGENCY DEPT VISIT LOW MDM: CPT

## 2024-06-13 RX ORDER — BUPROPION HYDROCHLORIDE 75 MG/1
75 TABLET ORAL 2 TIMES DAILY
COMMUNITY

## 2024-06-13 ASSESSMENT — PAIN DESCRIPTION - ORIENTATION: ORIENTATION: RIGHT

## 2024-06-13 ASSESSMENT — PAIN DESCRIPTION - LOCATION: LOCATION: ARM

## 2024-06-13 ASSESSMENT — PAIN - FUNCTIONAL ASSESSMENT: PAIN_FUNCTIONAL_ASSESSMENT: 0-10

## 2024-06-13 ASSESSMENT — PAIN SCALES - GENERAL: PAINLEVEL_OUTOF10: 8

## 2024-06-14 ENCOUNTER — APPOINTMENT (OUTPATIENT)
Facility: HOSPITAL | Age: 44
End: 2024-06-14
Payer: MEDICAID

## 2024-06-14 PROCEDURE — 73060 X-RAY EXAM OF HUMERUS: CPT

## 2024-06-14 PROCEDURE — 6370000000 HC RX 637 (ALT 250 FOR IP): Performed by: EMERGENCY MEDICINE

## 2024-06-14 RX ORDER — OXYCODONE HYDROCHLORIDE AND ACETAMINOPHEN 5; 325 MG/1; MG/1
1 TABLET ORAL
Status: COMPLETED | OUTPATIENT
Start: 2024-06-14 | End: 2024-06-14

## 2024-06-14 RX ADMIN — OXYCODONE HYDROCHLORIDE AND ACETAMINOPHEN 1 TABLET: 5; 325 TABLET ORAL at 01:59

## 2024-06-14 ASSESSMENT — ENCOUNTER SYMPTOMS
SORE THROAT: 0
EYE PAIN: 0
COLOR CHANGE: 0
COUGH: 0
SHORTNESS OF BREATH: 0
RHINORRHEA: 0
BACK PAIN: 0
ABDOMINAL PAIN: 0
VOMITING: 0
DIARRHEA: 0
NAUSEA: 0

## 2024-06-14 ASSESSMENT — PAIN SCALES - GENERAL: PAINLEVEL_OUTOF10: 6
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
## 2025-05-25 ENCOUNTER — APPOINTMENT (OUTPATIENT)
Facility: HOSPITAL | Age: 45
End: 2025-05-25
Payer: MEDICAID

## 2025-05-25 ENCOUNTER — HOSPITAL ENCOUNTER (EMERGENCY)
Facility: HOSPITAL | Age: 45
Discharge: HOME | End: 2025-05-25
Attending: EMERGENCY MEDICINE
Payer: MEDICAID

## 2025-05-25 VITALS
SYSTOLIC BLOOD PRESSURE: 128 MMHG | HEIGHT: 73 IN | WEIGHT: 175 LBS | DIASTOLIC BLOOD PRESSURE: 82 MMHG | OXYGEN SATURATION: 97 % | BODY MASS INDEX: 23.19 KG/M2 | HEART RATE: 60 BPM | TEMPERATURE: 98.2 F | RESPIRATION RATE: 14 BRPM

## 2025-05-25 DIAGNOSIS — R11.10 VOMITING, UNSPECIFIED VOMITING TYPE, UNSPECIFIED WHETHER NAUSEA PRESENT: ICD-10-CM

## 2025-05-25 DIAGNOSIS — R10.13 EPIGASTRIC PAIN: Primary | ICD-10-CM

## 2025-05-25 LAB
ALBUMIN SERPL-MCNC: 4.7 G/DL (ref 3.5–5.2)
ALBUMIN/GLOB SERPL: 1.6 (ref 1.1–2.2)
ALP SERPL-CCNC: 80 U/L (ref 40–129)
ALT SERPL-CCNC: 10 U/L (ref 10–50)
ANION GAP SERPL CALC-SCNC: 12 MMOL/L (ref 2–12)
AST SERPL-CCNC: 27 U/L (ref 10–50)
BASOPHILS # BLD: 0.03 K/UL (ref 0–0.1)
BASOPHILS NFR BLD: 0.4 % (ref 0–1)
BILIRUB SERPL-MCNC: 0.4 MG/DL (ref 0.2–1)
BUN SERPL-MCNC: 24 MG/DL (ref 6–20)
BUN/CREAT SERPL: 25 (ref 12–20)
CALCIUM SERPL-MCNC: 9.4 MG/DL (ref 8.6–10)
CHLORIDE SERPL-SCNC: 107 MMOL/L (ref 98–107)
CO2 SERPL-SCNC: 24 MMOL/L (ref 22–29)
COMMENT:: NORMAL
CREAT SERPL-MCNC: 0.97 MG/DL (ref 0.7–1.2)
DIFFERENTIAL METHOD BLD: NORMAL
EOSINOPHIL # BLD: 0.19 K/UL (ref 0–0.4)
EOSINOPHIL NFR BLD: 2.3 % (ref 0–7)
ERYTHROCYTE [DISTWIDTH] IN BLOOD BY AUTOMATED COUNT: 13.2 % (ref 11.5–14.5)
GLOBULIN SER CALC-MCNC: 3 G/DL (ref 2–4)
GLUCOSE SERPL-MCNC: 91 MG/DL (ref 65–100)
HCT VFR BLD AUTO: 42.3 % (ref 36.6–50.3)
HGB BLD-MCNC: 15.4 G/DL (ref 12.1–17)
IMM GRANULOCYTES # BLD AUTO: 0.01 K/UL (ref 0–0.04)
IMM GRANULOCYTES NFR BLD AUTO: 0.1 % (ref 0–0.5)
LIPASE SERPL-CCNC: 24 U/L (ref 13–60)
LYMPHOCYTES # BLD: 2.21 K/UL (ref 0.8–3.5)
LYMPHOCYTES NFR BLD: 26.6 % (ref 12–49)
MCH RBC QN AUTO: 33.6 PG (ref 26–34)
MCHC RBC AUTO-ENTMCNC: 36.4 G/DL (ref 30–36.5)
MCV RBC AUTO: 92.4 FL (ref 80–99)
MONOCYTES # BLD: 0.92 K/UL (ref 0–1)
MONOCYTES NFR BLD: 11.1 % (ref 5–13)
NEUTS SEG # BLD: 4.95 K/UL (ref 1.8–8)
NEUTS SEG NFR BLD: 59.5 % (ref 32–75)
NRBC # BLD: 0 K/UL (ref 0–0.01)
NRBC BLD-RTO: 0 PER 100 WBC
PLATELET # BLD AUTO: 199 K/UL (ref 150–400)
PMV BLD AUTO: 11 FL (ref 8.9–12.9)
POTASSIUM SERPL-SCNC: 3.9 MMOL/L (ref 3.5–5.1)
PROT SERPL-MCNC: 7.7 G/DL (ref 6.4–8.3)
RBC # BLD AUTO: 4.58 M/UL (ref 4.1–5.7)
SODIUM SERPL-SCNC: 143 MMOL/L (ref 136–145)
SPECIMEN HOLD: NORMAL
WBC # BLD AUTO: 8.3 K/UL (ref 4.1–11.1)

## 2025-05-25 PROCEDURE — 99285 EMERGENCY DEPT VISIT HI MDM: CPT

## 2025-05-25 PROCEDURE — 6360000004 HC RX CONTRAST MEDICATION: Performed by: EMERGENCY MEDICINE

## 2025-05-25 PROCEDURE — 83690 ASSAY OF LIPASE: CPT

## 2025-05-25 PROCEDURE — 6370000000 HC RX 637 (ALT 250 FOR IP): Performed by: EMERGENCY MEDICINE

## 2025-05-25 PROCEDURE — 80053 COMPREHEN METABOLIC PANEL: CPT

## 2025-05-25 PROCEDURE — 74178 CT ABD&PLV WO CNTR FLWD CNTR: CPT

## 2025-05-25 PROCEDURE — 96375 TX/PRO/DX INJ NEW DRUG ADDON: CPT

## 2025-05-25 PROCEDURE — 36415 COLL VENOUS BLD VENIPUNCTURE: CPT

## 2025-05-25 PROCEDURE — 6360000002 HC RX W HCPCS: Performed by: EMERGENCY MEDICINE

## 2025-05-25 PROCEDURE — 2580000003 HC RX 258: Performed by: EMERGENCY MEDICINE

## 2025-05-25 PROCEDURE — 96374 THER/PROPH/DIAG INJ IV PUSH: CPT

## 2025-05-25 PROCEDURE — 85025 COMPLETE CBC W/AUTO DIFF WBC: CPT

## 2025-05-25 RX ORDER — ONDANSETRON 2 MG/ML
4 INJECTION INTRAMUSCULAR; INTRAVENOUS ONCE
Status: COMPLETED | OUTPATIENT
Start: 2025-05-25 | End: 2025-05-25

## 2025-05-25 RX ORDER — ONDANSETRON 4 MG/1
4 TABLET, ORALLY DISINTEGRATING ORAL 3 TIMES DAILY PRN
Qty: 21 TABLET | Refills: 0 | Status: SHIPPED | OUTPATIENT
Start: 2025-05-25

## 2025-05-25 RX ORDER — ESOMEPRAZOLE MAGNESIUM 40 MG/1
40 CAPSULE, DELAYED RELEASE ORAL
Qty: 30 CAPSULE | Refills: 0 | Status: SHIPPED | OUTPATIENT
Start: 2025-05-25

## 2025-05-25 RX ORDER — ACETAMINOPHEN 500 MG
1000 TABLET ORAL
Status: COMPLETED | OUTPATIENT
Start: 2025-05-25 | End: 2025-05-25

## 2025-05-25 RX ORDER — SODIUM CHLORIDE, SODIUM LACTATE, POTASSIUM CHLORIDE, AND CALCIUM CHLORIDE .6; .31; .03; .02 G/100ML; G/100ML; G/100ML; G/100ML
1000 INJECTION, SOLUTION INTRAVENOUS ONCE
Status: COMPLETED | OUTPATIENT
Start: 2025-05-25 | End: 2025-05-25

## 2025-05-25 RX ORDER — IOPAMIDOL 755 MG/ML
100 INJECTION, SOLUTION INTRAVASCULAR
Status: COMPLETED | OUTPATIENT
Start: 2025-05-25 | End: 2025-05-25

## 2025-05-25 RX ADMIN — ACETAMINOPHEN 1000 MG: 500 TABLET ORAL at 16:17

## 2025-05-25 RX ADMIN — ONDANSETRON 4 MG: 2 INJECTION, SOLUTION INTRAMUSCULAR; INTRAVENOUS at 16:17

## 2025-05-25 RX ADMIN — IOPAMIDOL 100 ML: 755 INJECTION, SOLUTION INTRAVENOUS at 17:19

## 2025-05-25 RX ADMIN — SODIUM CHLORIDE, SODIUM LACTATE, POTASSIUM CHLORIDE, AND CALCIUM CHLORIDE 1000 ML: .6; .31; .03; .02 INJECTION, SOLUTION INTRAVENOUS at 16:18

## 2025-05-25 RX ADMIN — SODIUM CHLORIDE 80 MG: 9 INJECTION INTRAMUSCULAR; INTRAVENOUS; SUBCUTANEOUS at 16:18

## 2025-05-25 ASSESSMENT — PAIN SCALES - GENERAL
PAINLEVEL_OUTOF10: 7
PAINLEVEL_OUTOF10: 7

## 2025-05-25 ASSESSMENT — LIFESTYLE VARIABLES
HOW OFTEN DO YOU HAVE A DRINK CONTAINING ALCOHOL: PATIENT DECLINED
HOW MANY STANDARD DRINKS CONTAINING ALCOHOL DO YOU HAVE ON A TYPICAL DAY: PATIENT DECLINED

## 2025-05-25 ASSESSMENT — PAIN DESCRIPTION - LOCATION: LOCATION: ABDOMEN

## 2025-05-25 NOTE — DISCHARGE INSTRUCTIONS
We recommend smoking cessation, both marijuana and tobacco.  Both of these substances can make stomach problems worse.

## 2025-05-25 NOTE — ED PROVIDER NOTES
[place] EMERGENCY DEPARTMENT  EMERGENCY DEPARTMENT ENCOUNTER      Pt Name: Javy Benitez  MRN: [id number]  Birthdate 1980  Date of evaluation: 5/25/2025  Provider: Gerardo Fenton MD      HISTORY OF PRESENT ILLNESS      45-year-old male history of depression, chronic abdominal pain presents to the emergency department by EMS with chief complaint of hematemesis which began today.  He reports has been a long history of chronic abdominal pain with nausea and vomiting although it has been about a year since he last had an episode.  He has had multiple workups at [facility] including multiple upper endoscopies without clear etiology found.  He does smoke marijuana frequently.  Denies any alcohol use.  Also smokes tobacco.    The history is provided by the patient and medical records.           Nursing Notes were reviewed.    REVIEW OF SYSTEMS         Review of Systems        PAST MEDICAL HISTORY     Past Medical History:   Diagnosis Date    Depression          SURGICAL HISTORY     No past surgical history on file.      CURRENT MEDICATIONS       Previous Medications    BUPROPION (WELLBUTRIN) 75 MG TABLET    Take 1 tablet by mouth 2 times daily    IBUPROFEN (ADVIL;MOTRIN) 600 MG TABLET    Take 1 tablet by mouth 3 times daily as needed for Pain       ALLERGIES     Penicillins    FAMILY HISTORY     No family history on file.       SOCIAL HISTORY       Social History     Socioeconomic History    Marital status:    Tobacco Use    Smoking status: Every Day     Types: Cigarettes    Smokeless tobacco: Never   Vaping Use    Vaping status: Never Used   Substance and Sexual Activity    Alcohol use: Not Currently    Drug use: Not Currently     Types: Marijuana (Weed)    Sexual activity: Defer         PHYSICAL EXAM       ED Triage Vitals [05/25/25 1530]   BP Systolic BP Percentile Diastolic BP Percentile Temp Temp Source Pulse Respirations SpO2   131/86 -- -- 98.2 °F (36.8 °C) Oral 73 16 98 %      Height Weight - Scale

## 2025-05-25 NOTE — ED TRIAGE NOTES
Patient arrived via EMS from home with cc hematemesis that started yesterday and diffuse abdominal pain. Patient reports this is a chronic issue with multiple GI work ups through [facility]. Denies etoh use

## (undated) DEVICE — SOUTHSIDE TURNOVER: Brand: MEDLINE INDUSTRIES, INC.

## (undated) DEVICE — SUTURE ETHLN SZ 3-0 L18IN NONABSORBABLE BLK FS-1 L24MM 3/8 663H

## (undated) DEVICE — GOWN,SIRUS,POLYRNF,BRTHSLV,XL,30/CS: Brand: MEDLINE

## (undated) DEVICE — REM POLYHESIVE ADULT PATIENT RETURN ELECTRODE: Brand: VALLEYLAB

## (undated) DEVICE — INTENDED FOR TISSUE SEPARATION, AND OTHER PROCEDURES THAT REQUIRE A SHARP SURGICAL BLADE TO PUNCTURE OR CUT.: Brand: BARD-PARKER ® CARBON RIB-BACK BLADES

## (undated) DEVICE — MINOR EXTREMITY PACK: Brand: MEDLINE INDUSTRIES, INC.

## (undated) DEVICE — COVER LT HNDL BLU PLAS

## (undated) DEVICE — SPONGE GZ W4XL4IN COT 12 PLY TYP VII WVN C FLD DSGN

## (undated) DEVICE — STERILE POLYISOPRENE POWDER-FREE SURGICAL GLOVES: Brand: PROTEXIS

## (undated) DEVICE — 1010 S-DRAPE TOWEL DRAPE 10/BX: Brand: STERI-DRAPE™

## (undated) DEVICE — GARMENT,MEDLINE,DVT,INT,CALF,MED, GEN2: Brand: MEDLINE

## (undated) DEVICE — TOWEL SURG W17XL27IN STD BLU COT NONFENESTRATED PREWASHED

## (undated) DEVICE — PADDING CAST COHESIVE 4 YDX3 IN HND TEARABLE COTTON SPEC 100

## (undated) DEVICE — TUBING, SUCTION, 1/4" X 12', STRAIGHT: Brand: MEDLINE

## (undated) DEVICE — ZIMMER® STERILE DISPOSABLE TOURNIQUET CUFF WITH PLC, DUAL PORT, SINGLE BLADDER, 24 IN. (61 CM)

## (undated) DEVICE — GLOVE SURG SZ 85 L12IN FNGR THK79MIL GRN LTX FREE

## (undated) DEVICE — BASIC SINGLE BASIN-LF: Brand: MEDLINE INDUSTRIES, INC.

## (undated) DEVICE — SOLUTION IRRIG 1000ML 0.9% SOD CHL USP POUR PLAS BTL

## (undated) DEVICE — GAUZE,PACKING STRIP,IODOFORM,1"X5YD,STRL: Brand: CURAD

## (undated) DEVICE — QUIKLYTE DILUENT CHECK, 60ML: Brand: DIMENSION® QUIKLYTE® INTEGRATED MULTISENSOR DILUENT CHECK

## (undated) DEVICE — ROCKER SWITCH PENCIL BLADE ELECTRODE, HOLSTER: Brand: EDGE

## (undated) DEVICE — HANDPIECE SET WITH HIGH FLOW TIP AND SUCTION TUBE: Brand: INTERPULSE